# Patient Record
Sex: FEMALE | Race: WHITE | NOT HISPANIC OR LATINO | Employment: FULL TIME | URBAN - METROPOLITAN AREA
[De-identification: names, ages, dates, MRNs, and addresses within clinical notes are randomized per-mention and may not be internally consistent; named-entity substitution may affect disease eponyms.]

---

## 2024-03-08 ENCOUNTER — TELEPHONE (OUTPATIENT)
Dept: PULMONOLOGY | Facility: CLINIC | Age: 64
End: 2024-03-08
Payer: COMMERCIAL

## 2024-03-08 NOTE — TELEPHONE ENCOUNTER
Referral from [place] faxed to [facility] (received in email): pt needs to be triaged for ILD referral.           Mona King RN, BSN  ILD Nurse   [phone]    ----- Message from Mona King RN sent at 3/8/2024  3:11 PM CST -----  Regarding: VM: ILD Referral  Pt states Referral from [place] Pt  is [birth year] (not [birth year] as listed on referral)  Information from Dr. Viera as well as Teofilo should be in care everywhere per pt.   Pt call back # [phone]

## 2024-03-12 DIAGNOSIS — J84.9 ILD (INTERSTITIAL LUNG DISEASE) (H): Primary | ICD-10-CM

## 2024-03-18 ENCOUNTER — TELEPHONE (OUTPATIENT)
Dept: PULMONOLOGY | Facility: CLINIC | Age: 64
End: 2024-03-18
Payer: COMMERCIAL

## 2024-03-18 DIAGNOSIS — J84.9 ILD (INTERSTITIAL LUNG DISEASE) (H): Primary | ICD-10-CM

## 2024-03-18 NOTE — TELEPHONE ENCOUNTER
Orders placed for U/A, CBC with diff, CMP, CRP, repeat ANCA and HRCT w/o contrats for upcoming visit with Dr. Bailey.     Mona King, RN, BSN  ILD Nurse   [phone]    ----- Message from Kemal Beasley sent at 3/18/2024 10:25 AM CDT -----  Regarding: FW: Sooner appt??  Shanna, can you also place these lab orders that Yaya is requesting ??  Arnie !!!    Jorge    ----- Message -----  From: Jorge Bailey MD  Sent: 3/18/2024  10:00 AM CDT  To: Serafin Wu MD; Kemal Beasley; #  Subject: RE: Sooner appt??                                In addition, can we get the CT chest images performed in [place] pushed into our system (2/22/24, 1/20/24, 11/29/23, 3/9/23 and 7/21/22), and on the day she sees me in addition to Yancy's orders below we need U/A, CBC with diff, CMP, CRP and repeat ANCA.     Thank you   AK   ----- Message -----  From: Serafin Wu MD  Sent: 3/15/2024  10:59 PM CDT  To: Jorge Bailey MD; Kemal Beasley; #  Subject: RE: Sooner appt??                                Jorge,    I connected with [facility]. Patient has aggressive vasulitis and he has agreed to see the patient on one of his openings or 4/4. Please schedule patient for pedro, 6MWT and Ct chest without contrast if not done within the last 1 month (recent change in symptoms).   ----- Message -----  From: Kemal Beasley  Sent: 3/15/2024   4:11 PM CDT  To: Serafin Wu MD; Ailyn Salazar RN; #  Subject: RE: Sooner appt??                                Hi Yancy. There is nothing really avail before 5/28. The hemoptysis and the airway issue may be now a new thing as she appeared to be fine when I talked to her this week. Regardless, either you may have to overbook her or maybe squeeze her in in your LTX clinic if able (and we can have one of our nurses be with you in the transplant clinic when you see her ?) ...    Jorge    ----- Message -----  From: Ailyn Salazar RN  Sent: 3/15/2024   1:48 PM CDT  To: Kemal Beasley  Subject: Sooner appt??                                       ----- Message -----  From: Serafin Wu MD  Sent: 3/15/2024   1:46 PM CDT  To: [facility] Nurs[facility]    ILD team,    Dr Viera has called me few times about her. Worried about her course with hemoptysis and airway issues. She is scheduled with me for 5/28. Do I have any early openings? I can see her in the next 2-3 weeks.

## 2024-03-21 ENCOUNTER — LAB (OUTPATIENT)
Dept: LAB | Facility: CLINIC | Age: 64
End: 2024-03-21
Attending: INTERNAL MEDICINE
Payer: COMMERCIAL

## 2024-03-21 ENCOUNTER — MYC MEDICAL ADVICE (OUTPATIENT)
Dept: PULMONOLOGY | Facility: CLINIC | Age: 64
End: 2024-03-21

## 2024-03-21 ENCOUNTER — LAB REQUISITION (OUTPATIENT)
Dept: LAB | Facility: CLINIC | Age: 64
End: 2024-03-21
Payer: COMMERCIAL

## 2024-03-21 ENCOUNTER — OFFICE VISIT (OUTPATIENT)
Dept: PULMONOLOGY | Facility: CLINIC | Age: 64
End: 2024-03-21
Attending: INTERNAL MEDICINE
Payer: COMMERCIAL

## 2024-03-21 ENCOUNTER — ANCILLARY PROCEDURE (OUTPATIENT)
Dept: CT IMAGING | Facility: CLINIC | Age: 64
End: 2024-03-21
Attending: INTERNAL MEDICINE
Payer: COMMERCIAL

## 2024-03-21 VITALS
SYSTOLIC BLOOD PRESSURE: 130 MMHG | HEART RATE: 71 BPM | BODY MASS INDEX: 24.22 KG/M2 | OXYGEN SATURATION: 95 % | DIASTOLIC BLOOD PRESSURE: 83 MMHG | WEIGHT: 131.6 LBS | HEIGHT: 62 IN

## 2024-03-21 DIAGNOSIS — I77.82 ANCA-ASSOCIATED VASCULITIS (H): ICD-10-CM

## 2024-03-21 DIAGNOSIS — J84.9 ILD (INTERSTITIAL LUNG DISEASE) (H): ICD-10-CM

## 2024-03-21 DIAGNOSIS — Z79.2 PROPHYLACTIC ANTIBIOTIC: Primary | ICD-10-CM

## 2024-03-21 LAB
6 MIN WALK (FT): 1500 FT
6 MIN WALK (M): 457 M
ALBUMIN SERPL BCG-MCNC: 4.3 G/DL (ref 3.5–5.2)
ALBUMIN UR-MCNC: NEGATIVE MG/DL
ALP SERPL-CCNC: 60 U/L (ref 40–150)
ALT SERPL W P-5'-P-CCNC: 8 U/L (ref 0–50)
ANION GAP SERPL CALCULATED.3IONS-SCNC: 13 MMOL/L (ref 7–15)
APPEARANCE UR: CLEAR
AST SERPL W P-5'-P-CCNC: 11 U/L (ref 0–45)
BASOPHILS # BLD AUTO: 0 10E3/UL (ref 0–0.2)
BASOPHILS NFR BLD AUTO: 0 %
BILIRUB SERPL-MCNC: 0.5 MG/DL
BILIRUB UR QL STRIP: NEGATIVE
BUN SERPL-MCNC: 20 MG/DL (ref 8–23)
CALCIUM SERPL-MCNC: 9.3 MG/DL (ref 8.8–10.2)
CHLORIDE SERPL-SCNC: 102 MMOL/L (ref 98–107)
COLOR UR AUTO: ABNORMAL
CREAT SERPL-MCNC: 0.78 MG/DL (ref 0.51–0.95)
CRP SERPL-MCNC: <3 MG/L
DEPRECATED HCO3 PLAS-SCNC: 27 MMOL/L (ref 22–29)
EGFRCR SERPLBLD CKD-EPI 2021: 84 ML/MIN/1.73M2
EOSINOPHIL # BLD AUTO: 0.1 10E3/UL (ref 0–0.7)
EOSINOPHIL NFR BLD AUTO: 1 %
ERYTHROCYTE [DISTWIDTH] IN BLOOD BY AUTOMATED COUNT: 14.3 % (ref 10–15)
GLUCOSE SERPL-MCNC: 86 MG/DL (ref 70–99)
GLUCOSE UR STRIP-MCNC: NEGATIVE MG/DL
HCT VFR BLD AUTO: 40.9 % (ref 35–47)
HGB BLD-MCNC: 13.1 G/DL (ref 11.7–15.7)
HGB UR QL STRIP: NEGATIVE
IMM GRANULOCYTES # BLD: 0 10E3/UL
IMM GRANULOCYTES NFR BLD: 0 %
KETONES UR STRIP-MCNC: NEGATIVE MG/DL
LEUKOCYTE ESTERASE UR QL STRIP: NEGATIVE
LYMPHOCYTES # BLD AUTO: 3.3 10E3/UL (ref 0.8–5.3)
LYMPHOCYTES NFR BLD AUTO: 39 %
MCH RBC QN AUTO: 28.9 PG (ref 26.5–33)
MCHC RBC AUTO-ENTMCNC: 32 G/DL (ref 31.5–36.5)
MCV RBC AUTO: 90 FL (ref 78–100)
MONOCYTES # BLD AUTO: 0.6 10E3/UL (ref 0–1.3)
MONOCYTES NFR BLD AUTO: 7 %
MUCOUS THREADS #/AREA URNS LPF: PRESENT /LPF
NEUTROPHILS # BLD AUTO: 4.4 10E3/UL (ref 1.6–8.3)
NEUTROPHILS NFR BLD AUTO: 53 %
NITRATE UR QL: NEGATIVE
NRBC # BLD AUTO: 0 10E3/UL
NRBC BLD AUTO-RTO: 0 /100
PH UR STRIP: 5.5 [PH] (ref 5–7)
PLATELET # BLD AUTO: 270 10E3/UL (ref 150–450)
POTASSIUM SERPL-SCNC: 3.3 MMOL/L (ref 3.4–5.3)
PROT SERPL-MCNC: 7 G/DL (ref 6.4–8.3)
RBC # BLD AUTO: 4.54 10E6/UL (ref 3.8–5.2)
RBC URINE: 1 /HPF
SODIUM SERPL-SCNC: 142 MMOL/L (ref 135–145)
SP GR UR STRIP: 1.03 (ref 1–1.03)
SQUAMOUS EPITHELIAL: 9 /HPF
TRANSITIONAL EPI: <1 /HPF
UROBILINOGEN UR STRIP-MCNC: NORMAL MG/DL
WBC # BLD AUTO: 8.5 10E3/UL (ref 4–11)
WBC URINE: 2 /HPF

## 2024-03-21 PROCEDURE — 86037 ANCA TITER EACH ANTIBODY: CPT | Performed by: INTERNAL MEDICINE

## 2024-03-21 PROCEDURE — 86140 C-REACTIVE PROTEIN: CPT | Performed by: PATHOLOGY

## 2024-03-21 PROCEDURE — 94060 EVALUATION OF WHEEZING: CPT | Performed by: INTERNAL MEDICINE

## 2024-03-21 PROCEDURE — 99000 SPECIMEN HANDLING OFFICE-LAB: CPT | Performed by: PATHOLOGY

## 2024-03-21 PROCEDURE — 71250 CT THORAX DX C-: CPT | Mod: GC | Performed by: RADIOLOGY

## 2024-03-21 PROCEDURE — 88321 CONSLTJ&REPRT SLD PREP ELSWR: CPT | Performed by: STUDENT IN AN ORGANIZED HEALTH CARE EDUCATION/TRAINING PROGRAM

## 2024-03-21 PROCEDURE — 99213 OFFICE O/P EST LOW 20 MIN: CPT | Performed by: INTERNAL MEDICINE

## 2024-03-21 PROCEDURE — 85025 COMPLETE CBC W/AUTO DIFF WBC: CPT | Performed by: PATHOLOGY

## 2024-03-21 PROCEDURE — 94729 DIFFUSING CAPACITY: CPT | Performed by: INTERNAL MEDICINE

## 2024-03-21 PROCEDURE — 94726 PLETHYSMOGRAPHY LUNG VOLUMES: CPT | Performed by: INTERNAL MEDICINE

## 2024-03-21 PROCEDURE — 80053 COMPREHEN METABOLIC PANEL: CPT | Performed by: PATHOLOGY

## 2024-03-21 PROCEDURE — 94618 PULMONARY STRESS TESTING: CPT | Performed by: INTERNAL MEDICINE

## 2024-03-21 PROCEDURE — 99205 OFFICE O/P NEW HI 60 MIN: CPT | Mod: 25 | Performed by: INTERNAL MEDICINE

## 2024-03-21 PROCEDURE — 36415 COLL VENOUS BLD VENIPUNCTURE: CPT | Performed by: PATHOLOGY

## 2024-03-21 PROCEDURE — 81001 URINALYSIS AUTO W/SCOPE: CPT | Performed by: PATHOLOGY

## 2024-03-21 RX ORDER — ACETYLCYSTEINE 600 MG
600 CAPSULE ORAL 3 TIMES DAILY
COMMUNITY
End: 2024-03-21

## 2024-03-21 RX ORDER — BUDESONIDE, GLYCOPYRROLATE, AND FORMOTEROL FUMARATE 160; 9; 4.8 UG/1; UG/1; UG/1
AEROSOL, METERED RESPIRATORY (INHALATION)
COMMUNITY
Start: 2024-01-15 | End: 2024-03-21

## 2024-03-21 RX ORDER — SULFAMETHOXAZOLE AND TRIMETHOPRIM 400; 80 MG/1; MG/1
1 TABLET ORAL DAILY
COMMUNITY
Start: 2024-03-15 | End: 2024-05-08

## 2024-03-21 RX ORDER — PREDNISONE 20 MG/1
60 TABLET ORAL DAILY
Status: ON HOLD | COMMUNITY
Start: 2024-03-15 | End: 2024-07-20

## 2024-03-21 RX ORDER — ESTRADIOL 0.04 MG/D
1 PATCH, EXTENDED RELEASE TRANSDERMAL
COMMUNITY
Start: 2022-05-05

## 2024-03-21 RX ORDER — SULFAMETHOXAZOLE AND TRIMETHOPRIM 400; 80 MG/1; MG/1
1 TABLET ORAL DAILY
Qty: 30 TABLET | Refills: 0 | Status: SHIPPED | OUTPATIENT
Start: 2024-03-21 | End: 2024-04-20

## 2024-03-21 RX ORDER — VALACYCLOVIR HYDROCHLORIDE 500 MG/1
500 TABLET, FILM COATED ORAL 2 TIMES DAILY
COMMUNITY
Start: 2022-05-17

## 2024-03-21 RX ORDER — LEVOTHYROXINE SODIUM 112 UG/1
112 TABLET ORAL DAILY
COMMUNITY
Start: 2022-07-26

## 2024-03-21 ASSESSMENT — ENCOUNTER SYMPTOMS
DIARRHEA: 0
TREMORS: 1
BRUISES/BLEEDS EASILY: 0
FEVER: 0
WHEEZING: 1
EYE PAIN: 0
BLOOD IN STOOL: 0
TINGLING: 0
MYALGIAS: 0
ABDOMINAL PAIN: 0
WEIGHT LOSS: 1
BLURRED VISION: 0
COUGH: 1
EYE REDNESS: 0
HEMATURIA: 0
HEADACHES: 0
SPUTUM PRODUCTION: 1
HEARTBURN: 0
CHILLS: 0
SPEECH CHANGE: 0
HEMOPTYSIS: 1
STRIDOR: 0
SHORTNESS OF BREATH: 1

## 2024-03-21 ASSESSMENT — PAIN SCALES - GENERAL: PAINLEVEL: NO PAIN (0)

## 2024-03-21 NOTE — PROGRESS NOTES
[facility]    Reason for Visit  Rose Krishna is a 64 year old year old female who is being seen for Consult (New Vasculitis )    HPI  Patient is 64 years old female with no previous pulm history prior to the year 2021, remote diagnosis of ulcerative colitis 1985 post sulfa and prednisone at the time, never needing biologics and mainly self-limited over the last several years. She first developed symptoms of chronic cough in December 2021.  Her cough was dry at the time, but gradually became productive, coughing up up to half-1 cough of greenish frothy sputum every day.  In early 2022 she received Tessalon, followed albuterol inhaler [worsened cough], amoxicillin, then azithromycin (for pneumonia on CT), prednisone bursts starting with 20 mg max dose, and eventually Breo in July 2022 with no improvement in the cough at any point.  She has never been on an ACE inhibitor.  Chest imaging suggested pneumonia in May 2022 by CT for which azithromycin was given.  Her labs showed no eosinophilia but her MPO antibody was positive.  She then underwent cardiac, ENT and GI workup for other causes of cough and all returned within normal.  Methacholine challenge test was performed in July 2023 (FEV1 dropped by 22% and was interpreted as positive) which suggested a new diagnosis of asthma.  She was then prescribed multiple inhalers with no improvement including budesonide and Breztri, this is specifically perplexing as she also does not respond to prednisone 20 mg.  Nebulizers also do not help. She saw [place] (Dr. Velazquez) and at the time her bronchitis was suspected to be an association of her IBD, and this was prior to her lung consolidation finding. Myeloperoxidase antibody and P-ANCA were positive on repeat testing.  CT chest imaging in January 2024 showed a right lower lobe consolidation and she underwent bronchoscopy with BAL, her BAL did not show infection and there was no  eosinophils.  Right lower lobe demonstrated an ulcer which was biopsied, and we have the report from outside documenting granulomatous inflammation with negative AFB and fungal stains.  The airway exam was also significant for diffuse inflammation. She was started on prednisone at my direction 60 mg p.o. daily along with Bactrim on 3/16/24 when I received a phone call to discuss her case. She denies any change in her cough so far. She has never had hematuria, skin rash, skin ulcers, arthralgia, myalgia, eye symptoms, oral ulcers, tingling or numbness in any extremity, or facial nerve palsies.  She is a lifetime non-smoker and never had drug use.  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      Review of Systems   Constitutional:  Positive for malaise/fatigue and weight loss. Negative for chills and fever.   HENT:  Positive for nosebleeds. Negative for congestion and hearing loss.    Eyes:  Negative for blurred vision, pain and redness.   Respiratory:  Positive for cough, hemoptysis, sputum production, shortness of breath and wheezing. Negative for stridor.    Cardiovascular:  Positive for chest pain.   Gastrointestinal:  Negative for abdominal pain, blood in stool, diarrhea and heartburn.   Genitourinary:  Negative for hematuria.   Musculoskeletal:  Negative for joint pain and myalgias.   Skin:  Negative for itching and rash.   Neurological:  Positive for tremors. Negative for tingling, speech change and headaches.   Endo/Heme/Allergies:  Does not bruise/bleed easily.        Current Outpatient Medications   Medication    estradiol (VIVELLE-DOT) 0.0375 MG/24HR BIW patch    levothyroxine (SYNTHROID/LEVOTHROID) 100 MCG tablet    predniSONE (DELTASONE) 20 MG tablet    sulfamethoxazole-trimethoprim (BACTRIM) 400-80 MG tablet    valACYclovir (VALTREX) 500 MG tablet     No current facility-administered medications for this visit.     Allergies   Allergen Reactions    Sumatriptan  "Swelling, Other (See Comments) and Shortness Of Breath     Throat     History reviewed. No pertinent past medical history.    History reviewed. No pertinent surgical history.    Social History     Socioeconomic History    Marital status:      Spouse name: Not on file    Number of children: Not on file    Years of education: Not on file    Highest education level: Not on file   Occupational History    Not on file   Tobacco Use    Smoking status: Never    Smokeless tobacco: Never   Substance and Sexual Activity    Alcohol use: Not Currently    Drug use: Never    Sexual activity: Not on file   Other Topics Concern    Not on file   Social History Narrative    Not on file     Social Determinants of Health     Financial Resource Strain: Not on file   Food Insecurity: Not on file   Transportation Needs: Not on file   Physical Activity: Not on file   Stress: Not on file   Social Connections: Not on file   Interpersonal Safety: Not on file   Housing Stability: Not on file       History reviewed. No pertinent family history.      Vitals: /83   Pulse 71   Ht 1.575 m (5' 2\")   Wt 59.7 kg (131 lb 9.6 oz)   SpO2 95%   BMI 24.07 kg/m      Exam:   GENERAL APPEARANCE: Well developed, well nourished, alert, and in no apparent distress.  LYMPHATICS: No significant cervical, or supraclavicular nodes.  HEENT: Normal oral mucus membranes, tongue and dentition. Normal nose.   RESP: good air flow throughout.  No crackles. No rhonchi. No wheezes.  CV: Normal S1, S2, regular rhythm, normal rate. No murmur.  No LE edema.   MS: extremities normal. No clubbing. No cyanosis.  SKIN: no rash on limited exam.  NEURO: Mentation intact, speech normal, normal gait and stance.  PSYCH: mentation appears normal. and affect normal/bright.    Results:  PFTs 3/21/24      Chest imaging:  Reviewed HRCT chest images performed 3/21/24 and compared to outside CT images from 2/22/24, 1/20/24, 3/2023, and 7/2021  Waxing and waning RLL " consolidative lesion, that is now significantly smaller than prior, with RUL anterior smaller consolidation, no cavity lesions. Progressive diffuse bronchitis in 1993-1516 that is now seemingly improved.   Radiology report 3/21/24:     FINDINGS:  Mediastinum: Prominent thyroid without discrete nodularity, similar to  prior. The heart is normal in size. No significant  pericardial  effusion. No significant  coronary artery calcifications. The  ascending aorta and main pulmonary artery are normal in caliber. No   thoracic lymphadenopathy. The esophagus is unremarkable.      Lungs: The airway is clear. No pleural effusion or pneumothorax. Lower  lobe predominant bronchial wall thickening with multifocal mucous  plugging of the distal airways. Chronic linear atelectasis or scarring  at the lung bases, similar to prior. Cluster of micronodular opacities  in the posteromedial left lower lobe, new from prior. When comparing  to the 1/20/2024 study. The areas of peribronchial consolidation and  subsegmental consolidation are improving. No significant reticular  opacities or bronchiectasis. Mild mosaic attenuation predominantly  involving the lower lobes on expiratory views. Groundglass nodular  opacity in the anterior right upper lobe measures 1. (Series 3 image  92), not significant changed from 3/8/2023.     Upper Abdomen: Limited evaluation of the upper abdomen. Status post  cholecystectomy.     Bones/soft tissues: No acute or suspicious osseous lesion. Bilateral  breast implants.                                                                      IMPRESSION:   1. Findings suggestive of chronic bronchitis with lower lobe  predominant bronchial wall thickening and mild air trapping on  expiratory views.   2. Cluster of micronodular opacities in the left lower lobe,  suggesting acute infection/patient.  3. Linear atelectasis/scarring in the lung bases, similar to prior.  Previous areas of consolidation seen on 1/20/2024,  continued to  improve.  4. Stable 1.2 cm nodular groundglass opacity in the anterior right  upper lobe. This is favored sequelae of infection/inflammation however  continued attention on follow-up is recommended to ensure stability.   This result has not been signed. Information might be incomplete.     Bronchoscopy report 3/13/24  Findings:       The trachea is of normal caliber. The sundeep is sharp. The        tracheobronchial tree of the left lung was examined to at least the        first subsegmental level. Bronchial mucosa and anatomy in the left lung        were inflamed in appearance. There are no endobronchial lesions, and no        secretions.        Right Lung Abnormalities: An area of inflamed mucosa was found in the        right lower lobe. there were areas of ulceration and friability. There        were yellow plaque-like lesions in the RLL. The RLL superior segment was        inflamed and stenosed shut (unable to pass a forceps).        Endobronchial biopsies of a lesion were performed right lower lobe using        a forceps and sent for histopathology examination. 8 were obtained.        The bronchoscope was advanced until wedged at the desired location for        bronchoalveolar lavage. BAL was performed in the RLL lateral basal        segment (B9) of the lung and sent for cell count, bacterial culture,        viral smears & culture, and fungal & AFB analysis and cytology. 120 mL        of fluid were instilled. 40 mL were returned. The return was        blood-tinged. There were no mucoid plugs in the return fluid.   Impression:            - Hemoptysis with abnormal CXR                          - Chronic cough with abnormal CT                          - The airway examination of the left lung was abnormal                          - Mucosal inflammation, ulceration, yellow plaque was                          visualized in the right lower lobe. RLL superior                          segment was  inflamed/stenosed closed.     BAL 3/13/24  Negative blasto and histo antigens  Neutrophils 71%, Lymph 1%, Mono 28%    RLL Endobronchial biopsy 3/13/24=  Granulomatous inflammation, negative AFB and fungal stains     Assessment and plan:    ANCA-associated vasculitis, single organ, endobronchial inflammation, RLL superior segment stenosis and severe chronic bronchitis, BVAS=5   Rose is 64F with chronic productive cough starting in late 2021, hemoptysis most of 2023, repeatedly positive p-ANCA/MPO and recent finding on bronchoscopy showing ulceration and inflammation of RLL bronchi and stenosis of RLL superior segment, with endobronchial biopsy showing granulomatous inflammation. Stains for AFB and fungi were negative, and infectious BAL workup has been negative as well including histo and blasto testing. She also has a negative TB QFT. She has no extra-pulmonary involvement as she has already seen GI, ENT and had cardiac workup. Renal function and U/A within normal. She does have remote history of IBD that seems to be self-limited and has never received biological therapy.   - We started prednisone 60 mg po daily 3/16/24 after phone conversation with local pulmonologist Dr. Inna Viera, will taper down over 3 months  - Continue bactrim for PJP prophylaxis, and continue even below dose of 20 mg to help with airway inflammation   - [facility], she already had negative TB QFT, HIV and hepatitis viral panel   - Rituximab 375/m2 weekly x4   - CD20 with return labs in May     2. Chronic cough   Very troubling for her, failed ICS, tessalon, nebulizers, and po prednisone 20 mg. Gabapentin can be considered after inflammation control and reduction in sputum production. We discussed gabapentin prescription and side effects     3. Hemoptysis- improving   4. 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

## 2024-03-21 NOTE — PATIENT INSTRUCTIONS
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

## 2024-03-21 NOTE — LETTER
3/21/2024         RE: Rose Krishna  [address] [place] [address]        Dear Colleague,    Thank you for referring your patient, Rose Krishna, to the [facility] FOR LUNG SCIENCE AND [facility] [place]. Please see a copy of my visit note below.    [facility]    Reason for Visit  Rose Krishna is a 64 year old year old female who is being seen for Consult (New Vasculitis )    HPI  Patient is 64 years old female with no previous pulm history prior to the year 2021, remote diagnosis of ulcerative colitis 1985 post sulfa and prednisone at the time, never needing biologics and mainly self-limited over the last several years. She first developed symptoms of chronic cough in December 2021.  Her cough was dry at the time, but gradually became productive, coughing up up to half-1 cough of greenish frothy sputum every day.  In early 2022 she received Tessalon, followed albuterol inhaler [worsened cough], amoxicillin, then azithromycin (for pneumonia on CT), prednisone bursts starting with 20 mg max dose, and eventually Breo in July 2022 with no improvement in the cough at any point.  She has never been on an ACE inhibitor.  Chest imaging suggested pneumonia in May 2022 by CT for which azithromycin was given.  Her labs showed no eosinophilia but her MPO antibody was positive.  She then underwent cardiac, ENT and GI workup for other causes of cough and all returned within normal.  Methacholine challenge test was performed in July 2023 (FEV1 dropped by 22% and was interpreted as positive) which suggested a new diagnosis of asthma.  She was then prescribed multiple inhalers with no improvement including budesonide and Breztri, this is specifically perplexing as she also does not respond to prednisone 20 mg.  Nebulizers also do not help. She saw [place] (Dr. Velazquez) and at the time her bronchitis was suspected to be an association of her IBD, and  this was prior to her lung consolidation finding. Myeloperoxidase antibody and P-ANCA were positive on repeat testing.  CT chest imaging in January 2024 showed a right lower lobe consolidation and she underwent bronchoscopy with BAL, her BAL did not show infection and there was no eosinophils.  Right lower lobe demonstrated an ulcer which was biopsied, and we have the report from outside documenting granulomatous inflammation with negative AFB and fungal stains.  The airway exam was also significant for diffuse inflammation. She was started on prednisone at my direction 60 mg p.o. daily along with Bactrim on 3/16/24 when I received a phone call to discuss her case. She denies any change in her cough so far. She has never had hematuria, skin rash, skin ulcers, arthralgia, myalgia, eye symptoms, oral ulcers, tingling or numbness in any extremity, or facial nerve palsies.  She is a lifetime non-smoker and never had drug use.  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      Review of Systems   Constitutional:  Positive for malaise/fatigue and weight loss. Negative for chills and fever.   HENT:  Positive for nosebleeds. Negative for congestion and hearing loss.    Eyes:  Negative for blurred vision, pain and redness.   Respiratory:  Positive for cough, hemoptysis, sputum production, shortness of breath and wheezing. Negative for stridor.    Cardiovascular:  Positive for chest pain.   Gastrointestinal:  Negative for abdominal pain, blood in stool, diarrhea and heartburn.   Genitourinary:  Negative for hematuria.   Musculoskeletal:  Negative for joint pain and myalgias.   Skin:  Negative for itching and rash.   Neurological:  Positive for tremors. Negative for tingling, speech change and headaches.   Endo/Heme/Allergies:  Does not bruise/bleed easily.        Current Outpatient Medications   Medication     estradiol (VIVELLE-DOT) 0.0375 MG/24HR BIW patch     levothyroxine  "(SYNTHROID/LEVOTHROID) 100 MCG tablet     predniSONE (DELTASONE) 20 MG tablet     sulfamethoxazole-trimethoprim (BACTRIM) 400-80 MG tablet     valACYclovir (VALTREX) 500 MG tablet     No current facility-administered medications for this visit.     Allergies   Allergen Reactions     Sumatriptan Swelling, Other (See Comments) and Shortness Of Breath     Throat     History reviewed. No pertinent past medical history.    History reviewed. No pertinent surgical history.    Social History     Socioeconomic History     Marital status:      Spouse name: Not on file     Number of children: Not on file     Years of education: Not on file     Highest education level: Not on file   Occupational History     Not on file   Tobacco Use     Smoking status: Never     Smokeless tobacco: Never   Substance and Sexual Activity     Alcohol use: Not Currently     Drug use: Never     Sexual activity: Not on file   Other Topics Concern     Not on file   Social History Narrative     Not on file     Social Determinants of Health     Financial Resource Strain: Not on file   Food Insecurity: Not on file   Transportation Needs: Not on file   Physical Activity: Not on file   Stress: Not on file   Social Connections: Not on file   Interpersonal Safety: Not on file   Housing Stability: Not on file       History reviewed. No pertinent family history.      Vitals: /83   Pulse 71   Ht 1.575 m (5' 2\")   Wt 59.7 kg (131 lb 9.6 oz)   SpO2 95%   BMI 24.07 kg/m      Exam:   GENERAL APPEARANCE: Well developed, well nourished, alert, and in no apparent distress.  LYMPHATICS: No significant cervical, or supraclavicular nodes.  HEENT: Normal oral mucus membranes, tongue and dentition. Normal nose.   RESP: good air flow throughout.  No crackles. No rhonchi. No wheezes.  CV: Normal S1, S2, regular rhythm, normal rate. No murmur.  No LE edema.   MS: extremities normal. No clubbing. No cyanosis.  SKIN: no rash on limited exam.  NEURO: Mentation " intact, speech normal, normal gait and stance.  PSYCH: mentation appears normal. and affect normal/bright.    Results:  PFTs 3/21/24      Chest imaging:  Reviewed HRCT chest images performed 3/21/24 and compared to outside CT images from 2/22/24, 1/20/24, 3/2023, and 7/2021  Waxing and waning RLL consolidative lesion, that is now significantly smaller than prior, with RUL anterior smaller consolidation, no cavity lesions. Progressive diffuse bronchitis in 8899-0809 that is now seemingly improved.   Radiology report 3/21/24:     FINDINGS:  Mediastinum: Prominent thyroid without discrete nodularity, similar to  prior. The heart is normal in size. No significant  pericardial  effusion. No significant  coronary artery calcifications. The  ascending aorta and main pulmonary artery are normal in caliber. No   thoracic lymphadenopathy. The esophagus is unremarkable.      Lungs: The airway is clear. No pleural effusion or pneumothorax. Lower  lobe predominant bronchial wall thickening with multifocal mucous  plugging of the distal airways. Chronic linear atelectasis or scarring  at the lung bases, similar to prior. Cluster of micronodular opacities  in the posteromedial left lower lobe, new from prior. When comparing  to the 1/20/2024 study. The areas of peribronchial consolidation and  subsegmental consolidation are improving. No significant reticular  opacities or bronchiectasis. Mild mosaic attenuation predominantly  involving the lower lobes on expiratory views. Groundglass nodular  opacity in the anterior right upper lobe measures 1. (Series 3 image  92), not significant changed from 3/8/2023.     Upper Abdomen: Limited evaluation of the upper abdomen. Status post  cholecystectomy.     Bones/soft tissues: No acute or suspicious osseous lesion. Bilateral  breast implants.                                                                      IMPRESSION:   1. Findings suggestive of chronic bronchitis with lower  lobe  predominant bronchial wall thickening and mild air trapping on  expiratory views.   2. Cluster of micronodular opacities in the left lower lobe,  suggesting acute infection/patient.  3. Linear atelectasis/scarring in the lung bases, similar to prior.  Previous areas of consolidation seen on 1/20/2024, continued to  improve.  4. Stable 1.2 cm nodular groundglass opacity in the anterior right  upper lobe. This is favored sequelae of infection/inflammation however  continued attention on follow-up is recommended to ensure stability.   This result has not been signed. Information might be incomplete.     Bronchoscopy report 3/13/24  Findings:       The trachea is of normal caliber. The sundeep is sharp. The        tracheobronchial tree of the left lung was examined to at least the        first subsegmental level. Bronchial mucosa and anatomy in the left lung        were inflamed in appearance. There are no endobronchial lesions, and no        secretions.        Right Lung Abnormalities: An area of inflamed mucosa was found in the        right lower lobe. there were areas of ulceration and friability. There        were yellow plaque-like lesions in the RLL. The RLL superior segment was        inflamed and stenosed shut (unable to pass a forceps).        Endobronchial biopsies of a lesion were performed right lower lobe using        a forceps and sent for histopathology examination. 8 were obtained.        The bronchoscope was advanced until wedged at the desired location for        bronchoalveolar lavage. BAL was performed in the RLL lateral basal        segment (B9) of the lung and sent for cell count, bacterial culture,        viral smears & culture, and fungal & AFB analysis and cytology. 120 mL        of fluid were instilled. 40 mL were returned. The return was        blood-tinged. There were no mucoid plugs in the return fluid.   Impression:            - Hemoptysis with abnormal CXR                          -  Chronic cough with abnormal CT                          - The airway examination of the left lung was abnormal                          - Mucosal inflammation, ulceration, yellow plaque was                          visualized in the right lower lobe. RLL superior                          segment was inflamed/stenosed closed.     BAL 3/13/24  Negative blasto and histo antigens  Neutrophils 71%, Lymph 1%, Mono 28%    RLL Endobronchial biopsy 3/13/24=  Granulomatous inflammation, negative AFB and fungal stains     Assessment and plan:    ANCA-associated vasculitis, single organ, endobronchial inflammation, RLL superior segment stenosis and severe chronic bronchitis, BVAS=5   Rose is 64F with chronic productive cough starting in late 2021, hemoptysis most of 2023, repeatedly positive p-ANCA/MPO and recent finding on bronchoscopy showing ulceration and inflammation of RLL bronchi and stenosis of RLL superior segment, with endobronchial biopsy showing granulomatous inflammation. Stains for AFB and fungi were negative, and infectious BAL workup has been negative as well including histo and blasto testing. She also has a negative TB QFT. She has no extra-pulmonary involvement as she has already seen GI, ENT and had cardiac workup. Renal function and U/A within normal. She does have remote history of IBD that seems to be self-limited and has never received biological therapy.   - We started prednisone 60 mg po daily 3/16/24 after phone conversation with local pulmonologist Dr. Inna Viera, will taper down over 3 months  - Continue bactrim for PJP prophylaxis, and continue even below dose of 20 mg to help with airway inflammation   - Educational materials on Rituximab provided on [employer], she already had negative TB QFT, HIV and hepatitis viral panel   - Rituximab 375/m2 weekly x4   - CD20 with return labs in May     2. Chronic cough   Very troubling for her, failed ICS, tessalon, nebulizers, and po prednisone 20 mg.  Gabapentin can be considered after inflammation control and reduction in sputum production. We discussed gabapentin prescription and side effects     3. Hemoptysis- improving   4. Chronic bronchitis secondary to ANCA vasculitis     Return in May with PFTs, labs and HRCT chest     I spent 70 minutes reviewing chart, reviewing test results, talking with and examining patient, formulating plan, and documentation on the day of the encounter.            Met with patient after new consult visit with provider to introduce role as nurse care coordinator and provide direct phone number for future questions or concerns related to their care received in [facility]. Also provided contact information for [facility] Clinic Coordinator for use related to scheduling needs.     Oxygen: Currently using oxygen, 0 LPM at rest, 0 LPM with activity and 2 LPM nocturnally.     Patient scheduled for sleep study early April    Tasia Coulter RN      Again, thank you for allowing me to participate in the care of your patient.        Sincerely,        Jorge Bailey MD

## 2024-03-21 NOTE — PROGRESS NOTES
Met with patient after new consult visit with provider to introduce role as nurse care coordinator and provide direct phone number for future questions or concerns related to their care received in [facility]. Also provided contact information for [facility] Clinic Coordinator for use related to scheduling needs.     Oxygen: Currently using oxygen, 0 LPM at rest, 0 LPM with activity and 2 LPM nocturnally.     Patient scheduled for sleep study early April    Tasia Coulter RN

## 2024-03-22 DIAGNOSIS — I77.82 ANCA-ASSOCIATED VASCULITIS (H): Primary | ICD-10-CM

## 2024-03-22 RX ORDER — DIPHENHYDRAMINE HYDROCHLORIDE 50 MG/ML
50 INJECTION INTRAMUSCULAR; INTRAVENOUS
Start: 2024-03-25

## 2024-03-22 RX ORDER — HEPARIN SODIUM,PORCINE 10 UNIT/ML
5-20 VIAL (ML) INTRAVENOUS DAILY PRN
OUTPATIENT
Start: 2024-03-25

## 2024-03-22 RX ORDER — ACETAMINOPHEN 325 MG/1
650 TABLET ORAL ONCE
Start: 2024-03-25

## 2024-03-22 RX ORDER — HEPARIN SODIUM (PORCINE) LOCK FLUSH IV SOLN 100 UNIT/ML 100 UNIT/ML
5 SOLUTION INTRAVENOUS
OUTPATIENT
Start: 2024-03-25

## 2024-03-22 RX ORDER — METHYLPREDNISOLONE SODIUM SUCCINATE 125 MG/2ML
125 INJECTION, POWDER, LYOPHILIZED, FOR SOLUTION INTRAMUSCULAR; INTRAVENOUS
Start: 2024-03-25

## 2024-03-22 RX ORDER — MEPERIDINE HYDROCHLORIDE 25 MG/ML
25 INJECTION INTRAMUSCULAR; INTRAVENOUS; SUBCUTANEOUS EVERY 30 MIN PRN
OUTPATIENT
Start: 2024-03-25

## 2024-03-22 RX ORDER — METHYLPREDNISOLONE SODIUM SUCCINATE 125 MG/2ML
100 INJECTION, POWDER, LYOPHILIZED, FOR SOLUTION INTRAMUSCULAR; INTRAVENOUS ONCE
OUTPATIENT
Start: 2024-03-25

## 2024-03-22 RX ORDER — EPINEPHRINE 1 MG/ML
0.3 INJECTION, SOLUTION, CONCENTRATE INTRAVENOUS EVERY 5 MIN PRN
OUTPATIENT
Start: 2024-03-25

## 2024-03-22 RX ORDER — ALBUTEROL SULFATE 90 UG/1
1-2 AEROSOL, METERED RESPIRATORY (INHALATION)
Start: 2024-03-25

## 2024-03-22 RX ORDER — ALBUTEROL SULFATE 0.83 MG/ML
2.5 SOLUTION RESPIRATORY (INHALATION)
OUTPATIENT
Start: 2024-03-25

## 2024-03-22 RX ORDER — DIPHENHYDRAMINE HCL 25 MG
50 CAPSULE ORAL ONCE
Start: 2024-03-25

## 2024-03-22 NOTE — TELEPHONE ENCOUNTER
Nurse placed call to [place] infusion center, they require local provider order rituximab infusion  Call placed to Inna Viera's (referring providers) clinic at [phone]  Nurse Carmina at [place] pulmonology clinic reviewed that Inna can order rituximab but asked that Dr. Bailey order and fax orders to her  Writer confirmed order per Dr. Bailey  Therapy plan ordered and faxed to nurse Carmina at [phone]  Orders placed for monitoring labs, to be drawn 3 mos after final infusion   Call placed to patient to review where we are in the process of the infusion, and preferred lab for lab work.   Patient agreed to reach out middle of next week if she doesn't hear anything about the rituximab infusion. Patient requests labs be drawn at [facility], orders faxed to [phone]  Patient expressed gratitude and is agreeable to plan    Tasia Coulter RN

## 2024-03-24 LAB
ANCA AB PATTERN SER IF-IMP: NORMAL
C-ANCA TITR SER IF: NORMAL {TITER}

## 2024-03-25 LAB
PATH REPORT.COMMENTS IMP SPEC: NORMAL
PATH REPORT.FINAL DX SPEC: NORMAL
PATH REPORT.GROSS SPEC: NORMAL
PATH REPORT.MICROSCOPIC SPEC OTHER STN: NORMAL
PATH REPORT.RELEVANT HX SPEC: NORMAL
PATH REPORT.RELEVANT HX SPEC: NORMAL
PATH REPORT.SITE OF ORIGIN SPEC: NORMAL

## 2024-03-26 LAB
DLCOUNC-%PRED-PRE: 98 %
DLCOUNC-PRE: 18.14 ML/MIN/MMHG
DLCOUNC-PRED: 18.45 ML/MIN/MMHG
ERV-%PRED-PRE: 88 %
ERV-PRE: 0.86 L
ERV-PRED: 0.97 L
EXPTIME-PRE: 6.89 SEC
FEF2575-%PRED-POST: 39 %
FEF2575-%PRED-PRE: 43 %
FEF2575-POST: 0.75 L/SEC
FEF2575-PRE: 0.82 L/SEC
FEF2575-PRED: 1.9 L/SEC
FEFMAX-%PRED-PRE: 49 %
FEFMAX-PRE: 2.87 L/SEC
FEFMAX-PRED: 5.75 L/SEC
FEV1-%PRED-PRE: 65 %
FEV1-PRE: 1.36 L
FEV1FEV6-PRE: 65 %
FEV1FEV6-PRED: 80 %
FEV1FVC-PRE: 65 %
FEV1FVC-PRED: 80 %
FEV1SVC-PRE: 69 %
FEV1SVC-PRED: 70 %
FIFMAX-PRE: 2.21 L/SEC
FRCPLETH-%PRED-PRE: 103 %
FRCPLETH-PRE: 2.69 L
FRCPLETH-PRED: 2.59 L
FVC-%PRED-PRE: 80 %
FVC-PRE: 2.09 L
FVC-PRED: 2.6 L
IC-%PRED-PRE: 57 %
IC-PRE: 1.11 L
IC-PRED: 1.94 L
RVPLETH-%PRED-PRE: 97 %
RVPLETH-PRE: 1.83 L
RVPLETH-PRED: 1.88 L
TLCPLETH-%PRED-PRE: 82 %
TLCPLETH-PRE: 3.8 L
TLCPLETH-PRED: 4.6 L
VA-%PRED-PRE: 68 %
VA-PRE: 2.95 L
VC-%PRED-PRE: 66 %
VC-PRE: 1.97 L
VC-PRED: 2.97 L

## 2024-04-01 ENCOUNTER — TEAM CONFERENCE (OUTPATIENT)
Dept: PULMONOLOGY | Facility: CLINIC | Age: 64
End: 2024-04-01
Payer: COMMERCIAL

## 2024-04-01 NOTE — TELEPHONE ENCOUNTER
ILD Conference      Patient Name: Rose Krishna    Reason for conference discussion/Specific Question:  Treatment     Pathology Interpretation:   RLL transbronchial histocytes, microphages present, granulomas different then sarcoid,   Microscopic polyangiitis  IBD associated disease, or systemic vasculitis Patel Gómez MD (path)      There was a consensus recommendation for the following actions:     RTX infusions  Pred taper     Pulmonary/ILD Provider: Jorge Bailey MD (pulm)

## 2024-04-09 ENCOUNTER — TELEPHONE (OUTPATIENT)
Dept: PULMONOLOGY | Facility: CLINIC | Age: 64
End: 2024-04-09
Payer: COMMERCIAL

## 2024-04-09 NOTE — TELEPHONE ENCOUNTER
----- Message from Jaleesa Mcmahon sent at 4/8/2024 12:49 PM CDT -----  Regarding: RE: Rituxan  Could the Infusion Appointment Request be taken out of the therapy plan, so the orders would not continue to fall into the infusion work que?     Thank you,  Jaleesa FAJARDO [facility] - 2nd Floor  [facility]  [phone] (option 3 then option 2)  [facility] Scheduling   [phone] (option 5 then option 2)      ----- Message -----  From: Jorge Bailey MD  Sent: 4/8/2024  10:28 AM CDT  To: Tasia Coulter, DENIS; Jaleesa Mcmahon  Subject: RE: Rituxan                                      Yes I discussed with the patient that she will be receiving four infusions over four weeks of RTX locally, we will then decide in future visits how frequently she will need it again.     AK   ----- Message -----  From: Jaleesa Mcmahon  Sent: 4/7/2024  11:23 AM CDT  To: Jorge Bailey MD; Tasia Coulter, RN  Subject: Rituxan                                          Morning    Wondering if patient is aware of the Rituxan that is ordered and where she might like to do it?  Looks like she lives in [place]...     Or could the Infusion Appointment Request be taken out of the therapy plan, so the orders would not continue to fall into the infusion work que?     Thank you,  Jaleesa FAJARDO [facility] - 2nd Floor  [facility] Scheduling  [phone] (option 3 then option 2)  [facility] Scheduling   [phone] (option 5 then option 2)

## 2024-04-09 NOTE — TELEPHONE ENCOUNTER
A user error has taken place: encounter opened in error, closed for administrative reasons.  Tasia Coulter RN

## 2024-04-09 NOTE — PROGRESS NOTES
"Request received from infusion center to remove the \"appointment request\" from therapy plan as patient will be going to outside facility     Encounter opened to discontinue appointment request    Tasia Coulter RN    "

## 2024-05-08 DIAGNOSIS — J84.9 ILD (INTERSTITIAL LUNG DISEASE) (H): Primary | ICD-10-CM

## 2024-05-08 RX ORDER — SULFAMETHOXAZOLE AND TRIMETHOPRIM 400; 80 MG/1; MG/1
TABLET ORAL
Qty: 30 TABLET | Refills: 3 | Status: SHIPPED | OUTPATIENT
Start: 2024-05-08 | End: 2024-06-06

## 2024-05-15 ENCOUNTER — TRANSFERRED RECORDS (OUTPATIENT)
Dept: HEALTH INFORMATION MANAGEMENT | Facility: CLINIC | Age: 64
End: 2024-05-15
Payer: COMMERCIAL

## 2024-05-24 ENCOUNTER — TRANSFERRED RECORDS (OUTPATIENT)
Dept: HEALTH INFORMATION MANAGEMENT | Facility: CLINIC | Age: 64
End: 2024-05-24
Payer: COMMERCIAL

## 2024-05-29 ENCOUNTER — PATIENT OUTREACH (OUTPATIENT)
Dept: ONCOLOGY | Facility: CLINIC | Age: 64
End: 2024-05-29
Payer: COMMERCIAL

## 2024-05-29 DIAGNOSIS — M31.7 MPA (MICROSCOPIC POLYANGIITIS) (H): Primary | ICD-10-CM

## 2024-05-29 NOTE — PROGRESS NOTES
New IP (Interventional Pulmonology) referral rec'd.  Chart reviewed.       New Patient: Interventional Pulmonary (Lung nodule) Nurse Navigator Note    [facility]    Referred to (specialty): Interventional Pulmonary (Lung nodule)    Requested provider (if applicable): n/a    Date Referral Received: 5/29/2024    Evaluation for :  MPO ANCA patient with BI and RUL involvement including ulcerations and segmental stenosis, only partially responsive to systemic therapy needs IP to assess for local therapy    Clinical History (per Nurse review of records provided):    **BOOK MARKED**    3/24/2024:  PFT & 6 minute walk      6/6/2024:  Scheduled for CT High-resolution      EXAMINATION: Chest CT  3/21/2024 9:37 AM     CLINICAL HISTORY: very elevated ANCA, SOB and hemoptysis; ILD  (interstitial lung disease) (H)     COMPARISON: CT 2/22/2024.     TECHNIQUE: CT imaging obtained through the chest without contrast.  Axial, coronal, and sagittal reconstructions and axial MIP reformatted  images are reviewed. Inspiratory and expiratory views obtained.     FINDINGS:  Mediastinum: Prominent thyroid without discrete nodularity, similar to  prior. The heart is normal in size. No significant  pericardial  effusion. No significant  coronary artery calcifications. The  ascending aorta and main pulmonary artery are normal in caliber. No   thoracic lymphadenopathy. The esophagus is unremarkable.      Lungs: The airway is clear. No pleural effusion or pneumothorax. Lower  lobe predominant bronchial wall thickening with multifocal mucous  plugging of the distal airways. Chronic linear atelectasis or scarring  at the lung bases, similar to prior. Cluster of micronodular opacities  in the posteromedial left lower lobe, new from prior. When comparing  to the 1/20/2024 study. The areas of peribronchial consolidation and  subsegmental consolidation  are improving. No significant reticular  opacities or bronchiectasis. Mild mosaic attenuation predominantly  involving the lower lobes on expiratory views. Groundglass nodular  opacity in the anterior right upper lobe measures 1. (Series 3 image  92), not significant changed from 3/8/2023.     Upper Abdomen: Limited evaluation of the upper abdomen. Status post  cholecystectomy.     Bones/soft tissues: No acute or suspicious osseous lesion. Bilateral  breast implants.                                                                      IMPRESSION:   1. Findings suggestive of chronic bronchitis with lower lobe  predominant bronchial wall thickening and mild air trapping on  expiratory views.   2. Cluster of micronodular opacities in the left lower lobe,  suggesting acute infection/patient.  3. Linear atelectasis/scarring in the lung bases, similar to prior.  Previous areas of consolidation seen on 1/20/2024, continued to  improve.  4. Stable 1.2 cm nodular groundglass opacity in the anterior right  upper lobe. This is favored sequelae of infection/inflammation however  continued attention on follow-up is recommended to ensure stability.     I have personally reviewed the examination and initial interpretation  and I agree with the findings.     DIANDRA BOYLE MD    Records Location: [facility] & [facility]  & [place])    RECORDS NEEDED: Last FIVE years CHEST imaging pushed to PACS from [facility] [place] --thank you!     Additional testing needed prior to consult: NONE

## 2024-05-29 NOTE — PROGRESS NOTES
Received call from local rheumatologist Dr Ross, patient is having more respiratory symptoms despite completion of RTX x4 infusions. Rheum is adding avacopan, prednisone 60 taper and augmentin course to cover for infection which I am in agreement with. Will request an urgent IP appointment for potential need for bronchoscopy therapies for BI/RUL ulceration and stenosis reported by local pulmonologist which is likely worsening. We are also requesting recent CTA to be pushed into our system.

## 2024-05-30 ENCOUNTER — PRE VISIT (OUTPATIENT)
Dept: ONCOLOGY | Facility: CLINIC | Age: 64
End: 2024-05-30
Payer: COMMERCIAL

## 2024-05-30 NOTE — TELEPHONE ENCOUNTER
RECORDS STATUS - ALL OTHER DIAGNOSIS      RECORDS RECEIVED FROM:    Appt Date: TBD NN WQ    Last FIVE years CHEST imaging pushed to PACS from [facility] --thank you!!     Action    Action Taken 5/30/2024 12:15pm FRANKO     Med Records Dept @ [facility]     Ph: [phone]  Fax: [phone]  Mailing Address:   [address] [place] [address]    [employer] Tracking Number: [id number]    Red's scan is already in PACS      NOTES STATUS DETAILS   OFFICE NOTE from referring provider  Jorge Bailey MD    OFFICE NOTE from medical oncologist     DISCHARGE SUMMARY from hospital     DISCHARGE REPORT from the ER     OPERATIVE REPORT     MEDICATION LIST Complete [facility]   CLINICAL TRIAL TREATMENTS TO DATE     LABS     PATHOLOGY REPORTS     ANYTHING RELATED TO DIAGNOSIS Complete Labs last updated on 5/30/2024 in [facility]   PATHOLOGY [employer] TRACKING   TRACKING #:   GENONOMIC TESTING     TYPE:     IMAGING (NEED IMAGES & REPORT)     CT SCANS Scheduled    Requested- [facility] ([facility])  [facility]- Requested- CTA Chest 5/24/2024      In PACS:   CT Chest 3/21/2024   CTA Chest 2/22/2024  CTA Chest 1/20/2024  CT abdomen Pelvis 11/28/2023   CT Chest 3/8/2023   CTA Chest 7/21/2022    [place]- in PACS- 11/28/2023    XRAYS Requested- [facility])     Requested- [place]  Xray Chest 5/17/2024   Xray Chest 4/16/2024     Requested- Xray Chest [facility] in [place] 10/26/2020    IMAGE DISC [employer] TRACKING   TRACKING #:

## 2024-06-04 NOTE — PROGRESS NOTES
[facility]    Reason for Visit  Rose Krishna is a 64 year old year old female who is being seen for Interstitial Lung Disease (ILD) (ILD Follow up )  HPI  Patient is 64 years old female with no previous pulm history prior to the year 2021, remote diagnosis of ulcerative colitis 1985 post sulfa and prednisone at the time, never needing biologics and mainly self-limited over the last several years. She first developed symptoms of chronic cough in December 2021.  Her cough was dry at the time, but gradually became productive, coughing up up to half-1 cough of greenish frothy sputum every day.  In early 2022 she received Tessalon, followed albuterol inhaler [worsened cough], amoxicillin, then azithromycin (for pneumonia on CT), prednisone bursts starting with 20 mg max dose, and eventually Breo in July 2022 with no improvement in the cough at any point.  She has never been on an ACE inhibitor.  Chest imaging suggested pneumonia in May 2022 by CT for which azithromycin was given.  Her labs showed no eosinophilia but her MPO antibody was positive.  She then underwent cardiac, ENT and GI workup for other causes of cough and all returned within normal.  Methacholine challenge test was performed in July 2023 (FEV1 dropped by 22% and was interpreted as positive) which suggested a new diagnosis of asthma.  She was then prescribed multiple inhalers with no improvement including budesonide and Breztri, this is specifically perplexing as she also does not respond to prednisone 20 mg.  Nebulizers also do not help. She saw [place] (Dr. Velazquez) and at the time her bronchitis was suspected to be an association of her IBD, and this was prior to her lung consolidation finding. Myeloperoxidase antibody and P-ANCA were positive on repeat testing.  CT chest imaging in January 2024 showed a right lower lobe consolidation and she underwent bronchoscopy with BAL, her BAL did not show infection and  "there was no eosinophils.  Right lower lobe demonstrated an ulcer which was biopsied, and we have the report from outside documenting granulomatous inflammation with negative AFB and fungal stains.  The airway exam was also significant for diffuse inflammation. She was started on prednisone at my direction 60 mg p.o. daily along with Bactrim on 3/16/24 when I received a phone call to discuss her case. She is a lifetime non-smoker and never had drug use.  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    Interval Hx 6/24:  Started on Rituximab on 4/12 along with 3 days of solumedrol infusion for increasing hemoptysis. Had tried to titrate down on prednisone multiple times unsuccessfully. Had some stridor per PCP, worsening cough/congestion on 5/24 so CT neck ordered. Saw rheumatology on 5/29 and returned to 60 mg Prednisone, started on Tavneous and a 10 day course of Augmentin.  Planned for repeat bronch prior to starting cytoxin.     Overall, patient feels like she \"got run over by a truck\". Did not note a difference in her symptoms with increase of prednisone or starting the Tavneos this week so far. She has been having a \"wetter\", more productive cough, constant, worse in am. Still having peach/pink sputum. Much more shortness of breath with shorter distances, stairs are difficult. Feels wheezy, audible stridor worse when lying flat in bed. Sputum has been green but Augmentin helped this. No fevers, chills, night sweats. Not on any inhalers or nebs since they do not help her. No leg swelling, chest pain, orthopnea. BP today 137/91, worsening BP which is new for her and she is concerned about this.     Current Outpatient Medications   Medication Sig Dispense Refill    dexAMETHasone (DECADRON) 2 MG tablet Take 5 tablets (10 mg) by mouth daily (with breakfast) for 5 days, THEN 4 tablets (8 mg) daily (with breakfast) for 5 days, THEN 3 tablets (6 mg) daily (with breakfast) for 5 " days, THEN 2 tablets (4 mg) daily (with breakfast) for 5 days, THEN 1 tablet (2 mg) daily (with breakfast) for 5 days, THEN 0.5 tablets (1 mg) daily (with breakfast) for 5 days. 78 tablet 0    estradiol (VIVELLE-DOT) 0.0375 MG/24HR BIW patch Place 1 patch onto the skin      fludrocortisone (FLORINEF) 0.1 MG tablet Take 1 tablet (0.1 mg) by mouth daily for 5 days, THEN 1 tablet (0.1 mg) every 48 hours for 10 days. 10 tablet 0    levothyroxine (SYNTHROID/LEVOTHROID) 100 MCG tablet Take 100 mcg by mouth      predniSONE (DELTASONE) 20 MG tablet Take 60 mg by mouth daily      sulfamethoxazole-trimethoprim (BACTRIM DS) 800-160 MG tablet Take 1 tablet by mouth 2 times daily for 7 days 14 tablet 0    sulfamethoxazole-trimethoprim (BACTRIM DS) 800-160 MG tablet Take 1 tablet by mouth 2 times daily for 7 days 14 tablet 0    TAVNEOS 10 MG CAPS Take 30 mg by mouth      valACYclovir (VALTREX) 500 MG tablet Take 500 mg by mouth as needed       No current facility-administered medications for this visit.     Allergies   Allergen Reactions    Sumatriptan Swelling, Other (See Comments) and Shortness Of Breath     Throat     No past medical history on file.    No past surgical history on file.    Social History     Socioeconomic History    Marital status:      Spouse name: Not on file    Number of children: Not on file    Years of education: Not on file    Highest education level: Not on file   Occupational History    Not on file   Tobacco Use    Smoking status: Never    Smokeless tobacco: Never   Substance and Sexual Activity    Alcohol use: Not Currently    Drug use: Never    Sexual activity: Not on file   Other Topics Concern    Not on file   Social History Narrative    Not on file     Social Determinants of Health     Financial Resource Strain: Low Risk  (3/26/2024)    Received from  and Affiliates    Overall Financial Resource Strain (CARDIA)     Difficulty of Paying Living Expenses: Not very hard   Food  Insecurity: No Food Insecurity (3/26/2024)    Received from [facility]    Hunger Vital Sign     Worried About Running Out of Food in the Last Year: Never true     Ran Out of Food in the Last Year: Never true   Transportation Needs: No Transportation Needs (3/26/2024)    Received from [facility]    PRAPARE - Transportation     Lack of Transportation (Medical): No     Lack of Transportation (Non-Medical): No   Physical Activity: Insufficiently Active (3/26/2024)    Received from [facility]    Exercise Vital Sign     Days of Exercise per Week: 4 days     Minutes of Exercise per Session: 20 min   Stress: No Stress Concern Present (3/26/2024)    Received from [facility]    [employer] [place] [employer] - Occupational Stress Questionnaire     Feeling of Stress : Only a little   Social Connections: Socially Integrated (3/26/2024)    Received from [facility]    Social Connection and Isolation Panel [NHANES]     Frequency of Communication with Friends and Family: More than three times a week     Frequency of Social Gatherings with Friends and Family: More than three times a week     Attends Christian Services: More than 4 times per year     Active Member of Clubs or Organizations: Yes     Attends Club or Organization Meetings: 1 to 4 times per year     Marital Status:    Interpersonal Safety: Not At Risk (2/7/2023)    [facility]    Humiliation, Afraid, Rape, and Kick questionnaire     Fear of Current or Ex-Partner: No     Emotionally Abused: No     Physically Abused: No     Sexually Abused: No   Housing Stability: Low Risk  (3/26/2024)    Received from [facility]    Housing Stability Vital Sign     Unable to Pay for Housing in the Last Year: No     Number of Places Lived in the Last Year: 1     Unstable Housing in the Last Year: No  "      No family history on file.    Vitals: BP (!) 137/91   Pulse 86   Ht 1.575 m (5' 2\")   Wt 59.9 kg (132 lb)   SpO2 97%   BMI 24.14 kg/m      Exam:   GENERAL APPEARANCE: Well developed, well nourished, alert, and in no apparent distress.  LYMPHATICS: No significant cervical, or supraclavicular nodes.  HEENT: Normal oral mucus membranes, tongue and dentition. Normal nose. Neck appears swollen with upper airway wheezing but protecting airway.  RESP: good air flow throughout.  Diffuse bilateral rhonchi that improve with cough.  CV: Normal S1, S2, regular rhythm, normal rate. No murmur.  No LE edema.   MS: extremities normal. No clubbing. No cyanosis.  SKIN: no rash on limited exam.  NEURO: Mentation intact, speech normal, normal gait and stance.  PSYCH: mentation appears normal. and affect normal/bright.  Results:  PFTs 6/6/24      Chest imaging:    CT Chest 6/6/24:  IMPRESSION: Patchy groundglass opacities, bronchial wall thickening  and cavitary nodule right upper lobe compatible with ANCA vasculitis,  with groundglass opacities likely representing mild alveolar  hemorrhage likely accounting for hemoptysis.    Assessment and plan:    ANCA-associated vasculitis, single organ, endobronchial inflammation, RLL superior segment stenosis and severe chronic bronchitis, BVAS=5   Rose is 64F with chronic productive cough starting in late 2021, hemoptysis most of 2023, repeatedly positive p-ANCA/MPO and recent finding on bronchoscopy showing ulceration and inflammation of RLL bronchi and stenosis of RLL superior segment, with endobronchial biopsy showing granulomatous inflammation most c/w ANCA vasculitis. No other organ involvement at that time, treated with Rituximab x4 and steroids. Since last visit, patient had worsening of her respiratory status and now upper airway symptoms, not responsive to oral antibiotics and increased prednisone.  6/3/24 CT revealing laryngeal wall thickening, PFTS show worsening " obstruction but CT chest relatively stable. Most concerning for supraglottic involvement of her vasculitis contributing to decompensation, would first proceed with ENT consultation prior to IP evaluation.  -- ENT consult for evaluation of supraglottic laryngeal wall thickening as soon as possible  - Patient will keep her consult with interventional pulmonology here at the [place] to evaluate for repeat bronch with BAL +/- endobronchial stenting of stenosed airway RLL  -Given upper airway involvement, will transition prednisone to Decadron to address any component of upper airway edema.  Will start at 10 mg daily with taper   > Concomitantly will prescribe fludrocortisone taper given prolonged prednisone dose  -We will increase her Bactrim to therapeutic levels for airway inflammation as well for 10 days.  After that she will continue on her PJP prophylaxis dose    2. Chronic cough   Has not responded to ICS, tessalon, nebulizers. Gabapentin considered after inflammation control and reduction in sputum production. Not discussed further today.    3. Hemoptysis-stable  4. 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 Bailey.     Breonna Kimble  Internal Medicine PGY2    Attestation:   I saw and examined the patient with Resident.  Case discussed - agree with note.  Patient is 64F known to have MPO vasculitis (RUL ulceration and segmental stenosis, central airway disease-asthma like), s/p RTX infusions x4 April-May 2024, now on prednisone 60 mg for several weeks for refractory respiratory symptoms. Outside CT neck shows supraglottic laryngeal mass vs inflammation. CD20 yesterday 6/6/24 was zero however so RTX should be fully active at this point and her ANCA serology converted to negative. We will transition her to dexamethasone taper to minimize laryngeal edema per above, and supporting cytoxan per local rheumatology for further  immunosuppression to achieve symptom control. Will follow up on IP visit soon for potential intervention on bronchus intermedius stenosis seen on CT that might be contributing to her symptoms and the reason for her being symptomatic despite immunosuppression. Also an urgent local ENT referral has been placed to examine the supraglottic region.     Jorge Bailey M.D.    Total time spent today on patient encounter, documentation, review of chart and care coordination was 70 minutes.

## 2024-06-04 NOTE — TELEPHONE ENCOUNTER
Imaging DISC Received  June 4, 2024 12:34 PM ABT   Action: Imaging disc from [facility] received and uploaded to PACS. Image reports received and sent to HIM for upload.    05/24/24: CTA Chest  05/15/24: XR Chest  04/16/24: XR Chest

## 2024-06-06 ENCOUNTER — OFFICE VISIT (OUTPATIENT)
Dept: PULMONOLOGY | Facility: CLINIC | Age: 64
End: 2024-06-06
Attending: INTERNAL MEDICINE
Payer: COMMERCIAL

## 2024-06-06 ENCOUNTER — ANCILLARY PROCEDURE (OUTPATIENT)
Dept: CT IMAGING | Facility: CLINIC | Age: 64
End: 2024-06-06
Attending: INTERNAL MEDICINE
Payer: COMMERCIAL

## 2024-06-06 ENCOUNTER — LAB (OUTPATIENT)
Dept: LAB | Facility: CLINIC | Age: 64
End: 2024-06-06
Attending: INTERNAL MEDICINE
Payer: COMMERCIAL

## 2024-06-06 ENCOUNTER — MYC MEDICAL ADVICE (OUTPATIENT)
Dept: PULMONOLOGY | Facility: CLINIC | Age: 64
End: 2024-06-06

## 2024-06-06 VITALS
BODY MASS INDEX: 24.29 KG/M2 | SYSTOLIC BLOOD PRESSURE: 137 MMHG | DIASTOLIC BLOOD PRESSURE: 91 MMHG | HEART RATE: 86 BPM | OXYGEN SATURATION: 97 % | HEIGHT: 62 IN | WEIGHT: 132 LBS

## 2024-06-06 DIAGNOSIS — J04.30 SUPRAGLOTTITIS WITHOUT AIRWAY OBSTRUCTION: ICD-10-CM

## 2024-06-06 DIAGNOSIS — J84.9 ILD (INTERSTITIAL LUNG DISEASE) (H): Primary | ICD-10-CM

## 2024-06-06 DIAGNOSIS — I77.82 ANCA-ASSOCIATED VASCULITIS (H): Primary | ICD-10-CM

## 2024-06-06 DIAGNOSIS — I77.82 ANCA-ASSOCIATED VASCULITIS (H): ICD-10-CM

## 2024-06-06 DIAGNOSIS — J38.6 SUPRAGLOTTIC STENOSIS: ICD-10-CM

## 2024-06-06 LAB
ACANTHOCYTES BLD QL SMEAR: SLIGHT
ALBUMIN SERPL BCG-MCNC: 4.3 G/DL (ref 3.5–5.2)
ALP SERPL-CCNC: 52 U/L (ref 40–150)
ALT SERPL W P-5'-P-CCNC: 15 U/L (ref 0–50)
ANION GAP SERPL CALCULATED.3IONS-SCNC: 13 MMOL/L (ref 7–15)
AST SERPL W P-5'-P-CCNC: 14 U/L (ref 0–45)
BASOPHILS # BLD AUTO: ABNORMAL 10*3/UL
BASOPHILS # BLD MANUAL: 0 10E3/UL (ref 0–0.2)
BASOPHILS NFR BLD AUTO: ABNORMAL %
BASOPHILS NFR BLD MANUAL: 0 %
BILIRUB SERPL-MCNC: 0.3 MG/DL
BUN SERPL-MCNC: 19 MG/DL (ref 8–23)
CALCIUM SERPL-MCNC: 9.1 MG/DL (ref 8.8–10.2)
CHLORIDE SERPL-SCNC: 100 MMOL/L (ref 98–107)
CREAT SERPL-MCNC: 0.74 MG/DL (ref 0.51–0.95)
CRP SERPL-MCNC: <3 MG/L
DACRYOCYTES BLD QL SMEAR: SLIGHT
DEPRECATED HCO3 PLAS-SCNC: 26 MMOL/L (ref 22–29)
EGFRCR SERPLBLD CKD-EPI 2021: 90 ML/MIN/1.73M2
EOSINOPHIL # BLD AUTO: ABNORMAL 10*3/UL
EOSINOPHIL # BLD MANUAL: 0 10E3/UL (ref 0–0.7)
EOSINOPHIL NFR BLD AUTO: ABNORMAL %
EOSINOPHIL NFR BLD MANUAL: 0 %
ERYTHROCYTE [DISTWIDTH] IN BLOOD BY AUTOMATED COUNT: 16.5 % (ref 10–15)
GLUCOSE SERPL-MCNC: 110 MG/DL (ref 70–99)
HCT VFR BLD AUTO: 43.3 % (ref 35–47)
HGB BLD-MCNC: 14 G/DL (ref 11.7–15.7)
IMM GRANULOCYTES # BLD: ABNORMAL 10*3/UL
IMM GRANULOCYTES NFR BLD: ABNORMAL %
LYMPHOCYTES # BLD AUTO: ABNORMAL 10*3/UL
LYMPHOCYTES # BLD MANUAL: 1.1 10E3/UL (ref 0.8–5.3)
LYMPHOCYTES NFR BLD AUTO: ABNORMAL %
LYMPHOCYTES NFR BLD MANUAL: 8 %
MCH RBC QN AUTO: 29.3 PG (ref 26.5–33)
MCHC RBC AUTO-ENTMCNC: 32.3 G/DL (ref 31.5–36.5)
MCV RBC AUTO: 91 FL (ref 78–100)
METAMYELOCYTES # BLD MANUAL: 0.3 10E3/UL
METAMYELOCYTES NFR BLD MANUAL: 2 %
MONOCYTES # BLD AUTO: ABNORMAL 10*3/UL
MONOCYTES # BLD MANUAL: 0.4 10E3/UL (ref 0–1.3)
MONOCYTES NFR BLD AUTO: ABNORMAL %
MONOCYTES NFR BLD MANUAL: 3 %
MYELOCYTES # BLD MANUAL: 0.4 10E3/UL
MYELOCYTES NFR BLD MANUAL: 3 %
NEUTROPHILS # BLD AUTO: ABNORMAL 10*3/UL
NEUTROPHILS # BLD MANUAL: 11.3 10E3/UL (ref 1.6–8.3)
NEUTROPHILS NFR BLD AUTO: ABNORMAL %
NEUTROPHILS NFR BLD MANUAL: 84 %
NRBC # BLD AUTO: 0 10E3/UL
NRBC BLD AUTO-RTO: 0 /100
PLAT MORPH BLD: ABNORMAL
PLATELET # BLD AUTO: 338 10E3/UL (ref 150–450)
POTASSIUM SERPL-SCNC: 4 MMOL/L (ref 3.4–5.3)
PROT SERPL-MCNC: 6.6 G/DL (ref 6.4–8.3)
RBC # BLD AUTO: 4.78 10E6/UL (ref 3.8–5.2)
RBC MORPH BLD: ABNORMAL
SODIUM SERPL-SCNC: 139 MMOL/L (ref 135–145)
WBC # BLD AUTO: 13.4 10E3/UL (ref 4–11)

## 2024-06-06 PROCEDURE — 94726 PLETHYSMOGRAPHY LUNG VOLUMES: CPT | Performed by: INTERNAL MEDICINE

## 2024-06-06 PROCEDURE — 86355 B CELLS TOTAL COUNT: CPT | Mod: 90 | Performed by: PATHOLOGY

## 2024-06-06 PROCEDURE — 86036 ANCA SCREEN EACH ANTIBODY: CPT | Performed by: INTERNAL MEDICINE

## 2024-06-06 PROCEDURE — 99000 SPECIMEN HANDLING OFFICE-LAB: CPT | Performed by: PATHOLOGY

## 2024-06-06 PROCEDURE — 99215 OFFICE O/P EST HI 40 MIN: CPT | Mod: 25 | Performed by: INTERNAL MEDICINE

## 2024-06-06 PROCEDURE — 83876 ASSAY MYELOPEROXIDASE: CPT | Performed by: INTERNAL MEDICINE

## 2024-06-06 PROCEDURE — 94375 RESPIRATORY FLOW VOLUME LOOP: CPT | Performed by: INTERNAL MEDICINE

## 2024-06-06 PROCEDURE — 99213 OFFICE O/P EST LOW 20 MIN: CPT | Performed by: INTERNAL MEDICINE

## 2024-06-06 PROCEDURE — 36415 COLL VENOUS BLD VENIPUNCTURE: CPT | Performed by: PATHOLOGY

## 2024-06-06 PROCEDURE — 80053 COMPREHEN METABOLIC PANEL: CPT | Performed by: PATHOLOGY

## 2024-06-06 PROCEDURE — 94729 DIFFUSING CAPACITY: CPT | Performed by: INTERNAL MEDICINE

## 2024-06-06 PROCEDURE — 85007 BL SMEAR W/DIFF WBC COUNT: CPT | Performed by: PATHOLOGY

## 2024-06-06 PROCEDURE — 71250 CT THORAX DX C-: CPT | Performed by: RADIOLOGY

## 2024-06-06 PROCEDURE — 85027 COMPLETE CBC AUTOMATED: CPT | Performed by: PATHOLOGY

## 2024-06-06 PROCEDURE — 86140 C-REACTIVE PROTEIN: CPT | Performed by: PATHOLOGY

## 2024-06-06 PROCEDURE — 86356 MONONUCLEAR CELL ANTIGEN: CPT | Mod: 90 | Performed by: PATHOLOGY

## 2024-06-06 RX ORDER — FLUDROCORTISONE ACETATE 0.1 MG/1
TABLET ORAL
Qty: 10 TABLET | Refills: 0 | Status: SHIPPED | OUTPATIENT
Start: 2024-06-06 | End: 2024-06-18

## 2024-06-06 RX ORDER — SULFAMETHOXAZOLE/TRIMETHOPRIM 800-160 MG
1 TABLET ORAL 2 TIMES DAILY
Qty: 14 TABLET | Refills: 0 | Status: SHIPPED | OUTPATIENT
Start: 2024-06-06 | End: 2024-06-11

## 2024-06-06 RX ORDER — AVACOPAN 10 MG/1
30 CAPSULE ORAL 2 TIMES DAILY
Status: ON HOLD | COMMUNITY
Start: 2024-05-29 | End: 2024-07-22

## 2024-06-06 RX ORDER — SULFAMETHOXAZOLE/TRIMETHOPRIM 800-160 MG
1 TABLET ORAL 2 TIMES DAILY
Qty: 14 TABLET | Refills: 0 | Status: SHIPPED | OUTPATIENT
Start: 2024-06-06 | End: 2024-06-17

## 2024-06-06 RX ORDER — DEXAMETHASONE 2 MG/1
TABLET ORAL
Qty: 78 TABLET | Refills: 0 | Status: SHIPPED | OUTPATIENT
Start: 2024-06-06 | End: 2024-06-26

## 2024-06-06 ASSESSMENT — PAIN SCALES - GENERAL: PAINLEVEL: NO PAIN (0)

## 2024-06-06 NOTE — PATIENT INSTRUCTIONS
Please call our direct ILD nurses line for questions and concerns: [phone]    For scheduling, please call: [phone]

## 2024-06-06 NOTE — PROGRESS NOTES
Rose Krishna comes into clinic today at the request of Dr Bailey , for PFT    Tolerated testing well. No adverse reactions. Left lab in no distress.        PAUL MARAVILLA

## 2024-06-06 NOTE — NURSING NOTE
Chief Complaint   Patient presents with    Interstitial Lung Disease (ILD)     ILD Follow up      Vitals were taken and medications were reconciled.    Rosalia Casarez RMA  2:15 PM

## 2024-06-06 NOTE — LETTER
6/6/2024      Rose Krishna  [address] [place] [address]      Dear Colleague,    Thank you for referring your patient, Rose Krishna, to the [facility] FOR LUNG SCIENCE AND [facility] [place]. Please see a copy of my visit note below.    [facility] Interstitial Lung Disease Clinic    Reason for Visit  Rose Krishna is a 64 year old year old female who is being seen for Interstitial Lung Disease (ILD) (ILD Follow up )  HPI  Patient is 64 years old female with no previous pulm history prior to the year 2021, remote diagnosis of ulcerative colitis 1985 post sulfa and prednisone at the time, never needing biologics and mainly self-limited over the last several years. She first developed symptoms of chronic cough in December 2021.  Her cough was dry at the time, but gradually became productive, coughing up up to half-1 cough of greenish frothy sputum every day.  In early 2022 she received Tessalon, followed albuterol inhaler [worsened cough], amoxicillin, then azithromycin (for pneumonia on CT), prednisone bursts starting with 20 mg max dose, and eventually Breo in July 2022 with no improvement in the cough at any point.  She has never been on an ACE inhibitor.  Chest imaging suggested pneumonia in May 2022 by CT for which azithromycin was given.  Her labs showed no eosinophilia but her MPO antibody was positive.  She then underwent cardiac, ENT and GI workup for other causes of cough and all returned within normal.  Methacholine challenge test was performed in July 2023 (FEV1 dropped by 22% and was interpreted as positive) which suggested a new diagnosis of asthma.  She was then prescribed multiple inhalers with no improvement including budesonide and Breztri, this is specifically perplexing as she also does not respond to prednisone 20 mg.  Nebulizers also do not help. She saw Red (Dr. Velazquez) and at the time her bronchitis was suspected to be an association of her  "IBD, and this was prior to her lung consolidation finding. Myeloperoxidase antibody and P-ANCA were positive on repeat testing.  CT chest imaging in January 2024 showed a right lower lobe consolidation and she underwent bronchoscopy with BAL, her BAL did not show infection and there was no eosinophils.  Right lower lobe demonstrated an ulcer which was biopsied, and we have the report from outside documenting granulomatous inflammation with negative AFB and fungal stains.  The airway exam was also significant for diffuse inflammation. She was started on prednisone at my direction 60 mg p.o. daily along with Bactrim on 3/16/24 when I received a phone call to discuss her case. She is a lifetime non-smoker and never had drug use.  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    Interval Hx 6/24:  Started on Rituximab on 4/12 along with 3 days of solumedrol infusion for increasing hemoptysis. Had tried to titrate down on prednisone multiple times unsuccessfully. Had some stridor per PCP, worsening cough/congestion on 5/24 so CT neck ordered. Saw rheumatology on 5/29 and returned to 60 mg Prednisone, started on Tavneous and a 10 day course of Augmentin.  Planned for repeat bronch prior to starting cytoxin.     Overall, patient feels like she \"got run over by a truck\". Did not note a difference in her symptoms with increase of prednisone or starting the Tavneos this week so far. She has been having a \"wetter\", more productive cough, constant, worse in am. Still having peach/pink sputum. Much more shortness of breath with shorter distances, stairs are difficult. Feels wheezy, audible stridor worse when lying flat in bed. Sputum has been green but Augmentin helped this. No fevers, chills, night sweats. Not on any inhalers or nebs since they do not help her. No leg swelling, chest pain, orthopnea. BP today 137/91, worsening BP which is new for her and she is concerned about this. "     Current Outpatient Medications   Medication Sig Dispense Refill     dexAMETHasone (DECADRON) 2 MG tablet Take 5 tablets (10 mg) by mouth daily (with breakfast) for 5 days, THEN 4 tablets (8 mg) daily (with breakfast) for 5 days, THEN 3 tablets (6 mg) daily (with breakfast) for 5 days, THEN 2 tablets (4 mg) daily (with breakfast) for 5 days, THEN 1 tablet (2 mg) daily (with breakfast) for 5 days, THEN 0.5 tablets (1 mg) daily (with breakfast) for 5 days. 78 tablet 0     estradiol (VIVELLE-DOT) 0.0375 MG/24HR BIW patch Place 1 patch onto the skin       fludrocortisone (FLORINEF) 0.1 MG tablet Take 1 tablet (0.1 mg) by mouth daily for 5 days, THEN 1 tablet (0.1 mg) every 48 hours for 10 days. 10 tablet 0     levothyroxine (SYNTHROID/LEVOTHROID) 100 MCG tablet Take 100 mcg by mouth       predniSONE (DELTASONE) 20 MG tablet Take 60 mg by mouth daily       sulfamethoxazole-trimethoprim (BACTRIM DS) 800-160 MG tablet Take 1 tablet by mouth 2 times daily for 7 days 14 tablet 0     sulfamethoxazole-trimethoprim (BACTRIM DS) 800-160 MG tablet Take 1 tablet by mouth 2 times daily for 7 days 14 tablet 0     TAVNEOS 10 MG CAPS Take 30 mg by mouth       valACYclovir (VALTREX) 500 MG tablet Take 500 mg by mouth as needed       No current facility-administered medications for this visit.     Allergies   Allergen Reactions     Sumatriptan Swelling, Other (See Comments) and Shortness Of Breath     Throat     No past medical history on file.    No past surgical history on file.    Social History     Socioeconomic History     Marital status:      Spouse name: Not on file     Number of children: Not on file     Years of education: Not on file     Highest education level: Not on file   Occupational History     Not on file   Tobacco Use     Smoking status: Never     Smokeless tobacco: Never   Substance and Sexual Activity     Alcohol use: Not Currently     Drug use: Never     Sexual activity: Not on file   Other Topics Concern      Not on file   Social History Narrative     Not on file     Social Determinants of Health     Financial Resource Strain: Low Risk  (3/26/2024)    Received from [facility]    Overall Financial Resource Strain (CARDIA)      Difficulty of Paying Living Expenses: Not very hard   Food Insecurity: No Food Insecurity (3/26/2024)    Received from [facility]    Hunger Vital Sign      Worried About Running Out of Food in the Last Year: Never true      Ran Out of Food in the Last Year: Never true   Transportation Needs: No Transportation Needs (3/26/2024)    Received from [facility]    PRAPARE - Transportation      Lack of Transportation (Medical): No      Lack of Transportation (Non-Medical): No   Physical Activity: Insufficiently Active (3/26/2024)    Received from [facility]    Exercise Vital Sign      Days of Exercise per Week: 4 days      Minutes of Exercise per Session: 20 min   Stress: No Stress Concern Present (3/26/2024)    Received from [facility]    [employer] [place] [employer] - Occupational Stress Questionnaire      Feeling of Stress : Only a little   Social Connections: Socially Integrated (3/26/2024)    Received from [facility]    Social Connection and Isolation Panel [NHANES]      Frequency of Communication with Friends and Family: More than three times a week      Frequency of Social Gatherings with Friends and Family: More than three times a week      Attends Jew Services: More than 4 times per year      Active Member of Clubs or Organizations: Yes      Attends Club or Organization Meetings: 1 to 4 times per year      Marital Status:    Interpersonal Safety: Not At Risk (2/7/2023)    Received from [facility], [facility]    Humiliation, Afraid, Rape, and Kick questionnaire      Fear of Current or Ex-Partner: No      Emotionally  "Abused: No      Physically Abused: No      Sexually Abused: No   Housing Stability: Low Risk  (3/26/2024)    [facility] from [facility]    Housing Stability Vital Sign      Unable to Pay for Housing in the Last Year: No      Number of Places Lived in the Last Year: 1      Unstable Housing in the Last Year: No       No family history on file.    Vitals: BP (!) 137/91   Pulse 86   Ht 1.575 m (5' 2\")   Wt 59.9 kg (132 lb)   SpO2 97%   BMI 24.14 kg/m      Exam:   GENERAL APPEARANCE: Well developed, well nourished, alert, and in no apparent distress.  LYMPHATICS: No significant cervical, or supraclavicular nodes.  HEENT: Normal oral mucus membranes, tongue and dentition. Normal nose. Neck appears swollen with upper airway wheezing but protecting airway.  RESP: good air flow throughout.  Diffuse bilateral rhonchi that improve with cough.  CV: Normal S1, S2, regular rhythm, normal rate. No murmur.  No LE edema.   MS: extremities normal. No clubbing. No cyanosis.  SKIN: no rash on limited exam.  NEURO: Mentation intact, speech normal, normal gait and stance.  PSYCH: mentation appears normal. and affect normal/bright.  Results:  PFTs 6/6/24      Chest imaging:    CT Chest 6/6/24:  IMPRESSION: Patchy groundglass opacities, bronchial wall thickening  and cavitary nodule right upper lobe compatible with ANCA vasculitis,  with groundglass opacities likely representing mild alveolar  hemorrhage likely accounting for hemoptysis.    Assessment and plan:    ANCA-associated vasculitis, single organ, endobronchial inflammation, RLL superior segment stenosis and severe chronic bronchitis, BVAS=5   Rose is 64F with chronic productive cough starting in late 2021, hemoptysis most of 2023, repeatedly positive p-ANCA/MPO and recent finding on bronchoscopy showing ulceration and inflammation of RLL bronchi and stenosis of RLL superior segment, with endobronchial biopsy showing granulomatous inflammation most c/w ANCA " vasculitis. No other organ involvement at that time, treated with Rituximab x4 and steroids. Since last visit, patient had worsening of her respiratory status and now upper airway symptoms, not responsive to oral antibiotics and increased prednisone.  6/3/24 CT revealing laryngeal wall thickening, PFTS show worsening obstruction but CT chest relatively stable. Most concerning for supraglottic involvement of her vasculitis contributing to decompensation, would first proceed with ENT consultation prior to IP evaluation.  -- ENT consult for evaluation of supraglottic laryngeal wall thickening as soon as possible  - Patient will keep her consult with interventional pulmonology here at the [place] to evaluate for repeat bronch with BAL +/- endobronchial stenting of stenosed airway RLL  -Given upper airway involvement, will transition prednisone to Decadron to address any component of upper airway edema.  Will start at 10 mg daily with taper   > Concomitantly will prescribe fludrocortisone taper given prolonged prednisone dose  -We will increase her Bactrim to therapeutic levels for airway inflammation as well for 10 days.  After that she will continue on her PJP prophylaxis dose    2. Chronic cough   Has not responded to ICS, tessalon, nebulizers. Gabapentin considered after inflammation control and reduction in sputum production. Not discussed further today.    3. Hemoptysis-stable  4. 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 Bailey.     Breonna Kimble  Internal Medicine PGY2    Attestation:   I saw and examined the patient with Resident.  Case discussed - agree with note.  Patient is 64F known to have MPO vasculitis (RUL ulceration and segmental stenosis, central airway disease-asthma like), s/p RTX infusions x4 April-May 2024, now on prednisone 60 mg for several weeks for refractory respiratory symptoms. Outside CT neck shows  supraglottic laryngeal mass vs inflammation. CD20 yesterday 6/6/24 was zero however so RTX should be fully active at this point and her ANCA serology converted to negative. We will transition her to dexamethasone taper to minimize laryngeal edema per above, and supporting cytoxan per local rheumatology for further immunosuppression to achieve symptom control. Will follow up on IP visit soon for potential intervention on bronchus intermedius stenosis seen on CT that might be contributing to her symptoms and the reason for her being symptomatic despite immunosuppression. Also an urgent local ENT referral has been placed to examine the supraglottic region.     Jorge Bailey M.D.    Total time spent today on patient encounter, documentation, review of chart and care coordination was 70 minutes.       Again, thank you for allowing me to participate in the care of your patient.        Sincerely,        Jorge Bailey MD

## 2024-06-07 LAB
ANCA AB PATTERN SER IF-IMP: NORMAL
C-ANCA TITR SER IF: NORMAL {TITER}
CD19 CELLS # BLD: 0 CELLS/MCL (ref 56.6–417.4)
CD19 CELLS NFR BLD: 0 % (ref 4.6–22.1)
CD20 CELLS # BLD: 0 CELLS/MCL (ref 74.4–441.1)
CD20 CELLS NFR BLD: 0 % (ref 5–22.3)
CD45 CELLS # BLD: 1.14 THOU/MCL (ref 1–3.33)
DLCOCOR-%PRED-PRE: 99 %
DLCOCOR-PRE: 18.41 ML/MIN/MMHG
DLCOUNC-%PRED-PRE: 101 %
DLCOUNC-PRE: 18.74 ML/MIN/MMHG
DLCOUNC-PRED: 18.43 ML/MIN/MMHG
ERV-%PRED-PRE: 24 %
ERV-PRE: 0.24 L
ERV-PRED: 0.97 L
EXPTIME-PRE: 7.09 SEC
FEF2575-%PRED-PRE: 21 %
FEF2575-PRE: 0.41 L/SEC
FEF2575-PRED: 1.89 L/SEC
FEFMAX-%PRED-PRE: 50 %
FEFMAX-PRE: 2.91 L/SEC
FEFMAX-PRED: 5.73 L/SEC
FEV1-%PRED-PRE: 46 %
FEV1-PRE: 0.97 L
FEV1FEV6-PRE: 58 %
FEV1FEV6-PRED: 80 %
FEV1FVC-PRE: 56 %
FEV1FVC-PRED: 80 %
FEV1SVC-PRE: 53 %
FEV1SVC-PRED: 70 %
FIFMAX-PRE: 1.77 L/SEC
FRCPLETH-%PRED-PRE: 109 %
FRCPLETH-PRE: 2.84 L
FRCPLETH-PRED: 2.59 L
FVC-%PRED-PRE: 66 %
FVC-PRE: 1.72 L
FVC-PRED: 2.59 L
IC-%PRED-PRE: 81 %
IC-PRE: 1.59 L
IC-PRED: 1.94 L
MYELOPEROXIDASE AB SER IA-ACNC: 2.8 U/ML
MYELOPEROXIDASE AB SER IA-ACNC: NEGATIVE
RVPLETH-%PRED-PRE: 138 %
RVPLETH-PRE: 2.6 L
RVPLETH-PRED: 1.88 L
TLCPLETH-%PRED-PRE: 96 %
TLCPLETH-PRE: 4.42 L
TLCPLETH-PRED: 4.6 L
VA-%PRED-PRE: 70 %
VA-PRE: 3.05 L
VC-%PRED-PRE: 61 %
VC-PRE: 1.82 L
VC-PRED: 2.97 L

## 2024-06-10 ENCOUNTER — TELEPHONE (OUTPATIENT)
Dept: OTOLARYNGOLOGY | Facility: CLINIC | Age: 64
End: 2024-06-10
Payer: COMMERCIAL

## 2024-06-11 PROBLEM — M79.10 MYALGIA: Status: ACTIVE | Noted: 2024-05-23

## 2024-06-11 PROBLEM — R93.89 ABNORMAL FINDING OF DIAGNOSTIC IMAGING: Status: ACTIVE | Noted: 2022-12-20

## 2024-06-11 PROBLEM — D18.01 CHERRY ANGIOMA: Status: ACTIVE | Noted: 2022-12-28

## 2024-06-11 PROBLEM — F32.A DEPRESSION: Status: ACTIVE | Noted: 2020-09-21

## 2024-06-11 PROBLEM — M75.100 ROTATOR CUFF TEAR: Status: ACTIVE | Noted: 2022-12-28

## 2024-06-11 PROBLEM — N81.6 RECTOCELE: Status: ACTIVE | Noted: 2022-07-20

## 2024-06-11 PROBLEM — J44.9 MODERATE COPD (CHRONIC OBSTRUCTIVE PULMONARY DISEASE) (H): Status: ACTIVE | Noted: 2023-12-07

## 2024-06-11 PROBLEM — L82.1 SEBORRHEIC KERATOSES: Status: ACTIVE | Noted: 2022-07-26

## 2024-06-11 PROBLEM — J42 BRONCHITIS, CHRONIC (H): Chronic | Status: ACTIVE | Noted: 2023-11-16

## 2024-06-11 PROBLEM — M25.50 PAIN IN JOINT: Status: ACTIVE | Noted: 2024-05-23

## 2024-06-11 PROBLEM — K51.20 CHRONIC ULCERATIVE PROCTITIS (H): Chronic | Status: ACTIVE | Noted: 2023-11-16

## 2024-06-11 PROBLEM — R06.02 SHORTNESS OF BREATH: Status: ACTIVE | Noted: 2024-05-23

## 2024-06-11 PROBLEM — L81.4 LENTIGINES: Status: ACTIVE | Noted: 2022-12-28

## 2024-06-11 PROBLEM — E04.9 GOITER: Status: ACTIVE | Noted: 2020-02-10

## 2024-06-11 PROBLEM — G43.009 MIGRAINE WITHOUT AURA AND WITHOUT STATUS MIGRAINOSUS, NOT INTRACTABLE: Chronic | Status: ACTIVE | Noted: 2017-02-22

## 2024-06-11 PROBLEM — R06.1 STRIDOR: Status: ACTIVE | Noted: 2024-05-23

## 2024-06-11 PROBLEM — R05.3 CHRONIC COUGH: Chronic | Status: ACTIVE | Noted: 2022-12-19

## 2024-06-11 PROBLEM — G44.52 NEW PERSISTENT DAILY HEADACHE: Status: ACTIVE | Noted: 2024-05-23

## 2024-06-11 PROBLEM — D22.9 MULTIPLE BENIGN NEVI: Status: ACTIVE | Noted: 2022-07-26

## 2024-06-11 PROBLEM — R91.1 PULMONARY NODULE: Status: ACTIVE | Noted: 2022-07-26

## 2024-06-11 PROBLEM — E78.5 HYPERLIPIDEMIA: Chronic | Status: ACTIVE | Noted: 2018-03-05

## 2024-06-11 PROBLEM — M31.7 MICROSCOPIC POLYANGIITIS (H): Chronic | Status: ACTIVE | Noted: 2024-03-28

## 2024-06-11 PROBLEM — G60.8 SENSORY PERIPHERAL NEUROPATHY: Status: ACTIVE | Noted: 2024-04-29

## 2024-06-11 RX ORDER — ROFLUMILAST 500 UG/1
1 TABLET ORAL
Status: ON HOLD | COMMUNITY
Start: 2023-12-05 | End: 2024-07-20

## 2024-06-11 RX ORDER — PREDNISONE 10 MG/1
TABLET ORAL
Status: ON HOLD | COMMUNITY
Start: 2024-04-29 | End: 2024-07-20

## 2024-06-11 RX ORDER — BUDESONIDE 0.5 MG/2ML
INHALANT ORAL
Status: ON HOLD | COMMUNITY
Start: 2023-08-24 | End: 2024-07-20

## 2024-06-11 RX ORDER — FLUTICASONE PROPIONATE 50 MCG
SPRAY, SUSPENSION (ML) NASAL
Status: ON HOLD | COMMUNITY
Start: 2023-12-08 | End: 2024-07-20

## 2024-06-11 RX ORDER — MONTELUKAST SODIUM 10 MG/1
1 TABLET ORAL AT BEDTIME
Status: ON HOLD | COMMUNITY
Start: 2023-08-24 | End: 2024-07-20

## 2024-06-11 RX ORDER — INHALER,ASSIST DEVICE,LG MASK
SPACER (EA) MISCELLANEOUS SEE ADMIN INSTRUCTIONS
Status: ON HOLD | COMMUNITY
Start: 2023-12-22 | End: 2024-07-20

## 2024-06-11 NOTE — TELEPHONE ENCOUNTER
RECORDS STATUS - ALL OTHER DIAGNOSIS      RECORDS RECEIVED FROM:    Appt Date: 6/13/2024    MPA (microscopic polyangiitis) (H)     Action    Action Taken 6/11/2024 3:19pm FRANKO     I called [facility] IM[facility] dept [phone] - I was transferred to [phone]- [facility]. I called pt [phone] - I was transferred again..       NOTES STATUS DETAILS   OFFICE NOTE from referring provider  Jorge Bailey MD    OFFICE NOTE from medical oncologist Complete Pulmonology Records are in EPIC    DISCHARGE SUMMARY from hospital     DISCHARGE REPORT from the ER     OPERATIVE REPORT Complete General PFT 6/6/2024 more in EPIC   MEDICATION LIST Complete [facility]   CLINICAL TRIAL TREATMENTS TO DATE     LABS     PATHOLOGY REPORTS Path Consult in [facility] 3/21/2024   CASE FROM [place] ([id number], OBTAINED 03/13/24):  A. BRONCHUS, RIGHT LOWER LOBE, TRANSBRONCHIAL BIOPSY:  - Fragments of non-necrotizing granulomatous inflammation with giant cell reaction involving bronchial wall  - No evidence of vasculitis or malignancy   ANYTHING RELATED TO DIAGNOSIS Complete Labs last updated on 6/6/2024    PATHOLOGY FEDEX TRACKING   TRACKING #:   GENONOMIC TESTING     TYPE:     IMAGING (NEED IMAGES & REPORT)     CT SCANS Complete CT Chest 6/6/2024     CT Soft Tissue Neck 6/3/2024     CTA Chest 5/24/2024    More in PACS   XRAYS Complete    In PACS-  [facility]  Xray Chest 5/17/2024     Xray Chest 1/5/2024 ([facility])      ULTRASOUND     PET     IMAGE DISC FEDEX TRACKING   TRACKING #:

## 2024-06-13 ENCOUNTER — VIRTUAL VISIT (OUTPATIENT)
Dept: PULMONOLOGY | Facility: CLINIC | Age: 64
End: 2024-06-13
Attending: INTERNAL MEDICINE
Payer: COMMERCIAL

## 2024-06-13 ENCOUNTER — PRE VISIT (OUTPATIENT)
Dept: PULMONOLOGY | Facility: CLINIC | Age: 64
End: 2024-06-13
Payer: COMMERCIAL

## 2024-06-13 VITALS — BODY MASS INDEX: 23.19 KG/M2 | HEIGHT: 62 IN | WEIGHT: 126 LBS

## 2024-06-13 DIAGNOSIS — M31.7 MPA (MICROSCOPIC POLYANGIITIS) (H): ICD-10-CM

## 2024-06-13 PROCEDURE — 99214 OFFICE O/P EST MOD 30 MIN: CPT | Mod: 95 | Performed by: STUDENT IN AN ORGANIZED HEALTH CARE EDUCATION/TRAINING PROGRAM

## 2024-06-13 RX ORDER — CEFAZOLIN SODIUM 2 G/50ML
2 SOLUTION INTRAVENOUS SEE ADMIN INSTRUCTIONS
Status: CANCELLED | OUTPATIENT
Start: 2024-06-13

## 2024-06-13 RX ORDER — CEFAZOLIN SODIUM 2 G/50ML
2 SOLUTION INTRAVENOUS
Status: CANCELLED | OUTPATIENT
Start: 2024-06-13

## 2024-06-13 RX ORDER — METRONIDAZOLE 500 MG/100ML
500 INJECTION, SOLUTION INTRAVENOUS
Status: CANCELLED | OUTPATIENT
Start: 2024-06-13

## 2024-06-13 ASSESSMENT — PAIN SCALES - GENERAL: PAINLEVEL: NO PAIN (0)

## 2024-06-13 NOTE — LETTER
6/13/2024       RE: Rose Krishna  [address] [place] [address]     Dear Colleague,    Thank you for referring your patient, Rose Krishna, to the [facility] at [facility]. Please see a copy of my visit note below.    Virtual Visit Details    Type of service:  Video Visit     Originating Location (pt. Location): Home    Distant Location (provider location):  On-site  Platform used for Video Visit: [facility]    LUNG NODULE & INTERVENTIONAL PULMONARY CLINIC  [facility]    Rose Krishna MRN# [id number]   Age: 64 year old YOB: 1960     Reason for Consultation: BI stenosis    Requesting Physician: Jorge Bailey MD  [address] [place] [id number]     Assessment and Plan:    Rose Krishna is a 64 year old female who presents for evaluation of bronchial stenosis in the setting of ANCA vasculitis.     I independently reviewed their CT scan from 6/6/24 which reveals mild narrowing of her bronchus intermedisu. Clinically, she has to stop talking after a few words to take a breath so I can certainly appreciate there is some stenosis in her airways.     Plan for rigid bronchoscopy, possible balloon dilation, possible steroid injection, possible CO2 laser. Please also perform biopsy of the bronchial wall and send for culture.     Her PCP will no her PAC.    Patient indicated understanding and agreed to the plan of care. All questions answered.     Deepak Cole DO   of Medicine  Interventional Pulmonology  Department of Pulmonary, Allergy, Critical Care and Sleep Medicine   [facility]     History:  Rose Krishna is a 64 year old female who presents for evaluation of airway stenosis.   Pmhx of ANCA vasculitis. Diagnosed March 2024. Had a bronchoscopy in [facility] on 3/13/24 which showed 'abnormality' in her right lung. She has completed 4  rituximab and 3 solumedrol doses. She is more short of breath despite this. She is on tavneous now which is an oral immunusuppresent. Had ENT scope her in [place] recently, which did not reveal any supraglotic stenosis. Other PMHx includes A fib  Cough? yes  Shortness of breath? Yes  Smoking history: none  Prior history of lung problems: Yes, ANCA vasculitis.   Family history: Yes, mom had mylodysplastic syndrome. Dad has prostate cancer.  Exposure to fungus/mold: No  Exposure to tuberculosis: No  Radon exposure: no  Asbestos exposure: no  Uranium exposure: No  Immunosuppressed? Yes  Difficulty swallowing? No  Weight loss? Yes 15lbs in 1 year  Night sweats? No  Fever or chills? No    Medications:  Current Outpatient Medications   Medication Sig Dispense Refill     aerochamber plus with mask - large/blue/>5 years See Admin Instructions use with inhaler       budesonide (PULMICORT) 0.5 MG/2ML neb solution INHALE 1 VIAL VIA NEBULIZER TWICE A DAY RINSE MOUTH AFTER USE       dexAMETHasone (DECADRON) 2 MG tablet Take 5 tablets (10 mg) by mouth daily (with breakfast) for 5 days, THEN 4 tablets (8 mg) daily (with breakfast) for 5 days, THEN 3 tablets (6 mg) daily (with breakfast) for 5 days, THEN 2 tablets (4 mg) daily (with breakfast) for 5 days, THEN 1 tablet (2 mg) daily (with breakfast) for 5 days, THEN 0.5 tablets (1 mg) daily (with breakfast) for 5 days. 78 tablet 0     estradiol (VIVELLE-DOT) 0.0375 MG/24HR BIW patch Place 1 patch onto the skin       fludrocortisone (FLORINEF) 0.1 MG tablet Take 1 tablet (0.1 mg) by mouth daily for 5 days, THEN 1 tablet (0.1 mg) every 48 hours for 10 days. 10 tablet 0     fluticasone (FLONASE) 50 MCG/ACT nasal spray INHALE 2 SPRAYS IN EACH NOSTRIL 1 TIME PER DAY; AFTER 1 WEEK, MAY ADJUST TO 1 - 2 SPRAYS IN EACH NOSTRIL 1 TIME PER DAY.       levothyroxine (SYNTHROID/LEVOTHROID) 100 MCG tablet Take 100 mcg by mouth       montelukast (SINGULAIR) 10 MG tablet Take 1 tablet by mouth at  "bedtime       omeprazole (PRILOSEC) 20 MG DR capsule Take 20 mg by mouth       predniSONE (DELTASONE) 10 MG tablet TAKE 4 TABLETS (40 MG) BY MOUTH 1 TIME PER DAY       predniSONE (DELTASONE) 20 MG tablet Take 60 mg by mouth daily       roflumilast (DALIRESP) 500 MCG TABS tablet Take 1 tablet by mouth daily at 2 pm       sulfamethoxazole-trimethoprim (BACTRIM DS) 800-160 MG tablet Take 1 tablet by mouth 2 times daily for 7 days 14 tablet 0     TAVNEOS 10 MG CAPS Take 30 mg by mouth       valACYclovir (VALTREX) 500 MG tablet Take 500 mg by mouth as needed       No current facility-administered medications for this visit.         Physical exam:  Ht 1.575 m (5' 2\")   Wt 57.2 kg (126 lb)   BMI 23.05 kg/m    Wt Readings from Last 4 Encounters:   06/13/24 57.2 kg (126 lb)   06/06/24 59.9 kg (132 lb)   03/21/24 59.7 kg (131 lb 9.6 oz)     General: Well appearing, nonlabored breathing  Neuro: Answering questions appropriately  Psych: Normal affect     Again, thank you for allowing me to participate in the care of your patient.      Sincerely,    Deepak Cole, DO    "

## 2024-06-13 NOTE — PROGRESS NOTES
Virtual Visit Details    Type of service:  Video Visit     Originating Location (pt. Location): Home    Distant Location (provider location):  On-site  Platform used for Video Visit: [facility]    LUNG NODULE & INTERVENTIONAL PULMONARY CLINIC  [facility]    Rose Krishna MRN# [id number]   Age: 64 year old YOB: 1960     Reason for Consultation: BI stenosis    Requesting Physician: Jorge Bailey MD  [address] [place] [address]     Assessment and Plan:    Rose Krishna is a 64 year old female who presents for evaluation of bronchial stenosis in the setting of ANCA vasculitis.     I independently reviewed their CT scan from 6/6/24 which reveals mild narrowing of her bronchus intermedisu. Clinically, she has to stop talking after a few words to take a breath so I can certainly appreciate there is some stenosis in her airways.     Plan for rigid bronchoscopy, possible balloon dilation, possible steroid injection, possible CO2 laser. Please also perform biopsy of the bronchial wall and send for culture.     Her PCP will no her PAC.    Patient indicated understanding and agreed to the plan of care. All questions answered.     Deepak Cole DO   of Medicine  Interventional Pulmonology  Department of Pulmonary, Allergy, Critical Care and Sleep Medicine   [facility]     History:  Rose Krishna is a 64 year old female who presents for evaluation of airway stenosis.   Pmhx of ANCA vasculitis. Diagnosed March 2024. Had a bronchoscopy in [facility] on 3/13/24 which showed 'abnormality' in her right lung. She has completed 4 rituximab and 3 solumedrol doses. She is more short of breath despite this. She is on tavneous now which is an oral immunusuppresent. Had ENT scope her in [place] recently, which did not reveal any supraglotic stenosis. Other PMHx includes A fib  Cough? yes  Shortness of breath? Yes  Smoking history: none  Prior  history of lung problems: Yes, ANCA vasculitis.   Family history: Yes, mom had mylodysplastic syndrome. Dad has prostate cancer.  Exposure to fungus/mold: No  Exposure to tuberculosis: No  Radon exposure: no  Asbestos exposure: no  Uranium exposure: No  Immunosuppressed? Yes  Difficulty swallowing? No  Weight loss? Yes 15lbs in 1 year  Night sweats? No  Fever or chills? No    Medications:  Current Outpatient Medications   Medication Sig Dispense Refill    aerochamber plus with mask - large/blue/>5 years See Admin Instructions use with inhaler      budesonide (PULMICORT) 0.5 MG/2ML neb solution INHALE 1 VIAL VIA NEBULIZER TWICE A DAY RINSE MOUTH AFTER USE      dexAMETHasone (DECADRON) 2 MG tablet Take 5 tablets (10 mg) by mouth daily (with breakfast) for 5 days, THEN 4 tablets (8 mg) daily (with breakfast) for 5 days, THEN 3 tablets (6 mg) daily (with breakfast) for 5 days, THEN 2 tablets (4 mg) daily (with breakfast) for 5 days, THEN 1 tablet (2 mg) daily (with breakfast) for 5 days, THEN 0.5 tablets (1 mg) daily (with breakfast) for 5 days. 78 tablet 0    estradiol (VIVELLE-DOT) 0.0375 MG/24HR BIW patch Place 1 patch onto the skin      fludrocortisone (FLORINEF) 0.1 MG tablet Take 1 tablet (0.1 mg) by mouth daily for 5 days, THEN 1 tablet (0.1 mg) every 48 hours for 10 days. 10 tablet 0    fluticasone (FLONASE) 50 MCG/ACT nasal spray INHALE 2 SPRAYS IN EACH NOSTRIL 1 TIME PER DAY; AFTER 1 WEEK, MAY ADJUST TO 1 - 2 SPRAYS IN EACH NOSTRIL 1 TIME PER DAY.      levothyroxine (SYNTHROID/LEVOTHROID) 100 MCG tablet Take 100 mcg by mouth      montelukast (SINGULAIR) 10 MG tablet Take 1 tablet by mouth at bedtime      omeprazole (PRILOSEC) 20 MG DR capsule Take 20 mg by mouth      predniSONE (DELTASONE) 10 MG tablet TAKE 4 TABLETS (40 MG) BY MOUTH 1 TIME PER DAY      predniSONE (DELTASONE) 20 MG tablet Take 60 mg by mouth daily      roflumilast (DALIRESP) 500 MCG TABS tablet Take 1 tablet by mouth daily at 2 pm       "sulfamethoxazole-trimethoprim (BACTRIM DS) 800-160 MG tablet Take 1 tablet by mouth 2 times daily for 7 days 14 tablet 0    TAVNEOS 10 MG CAPS Take 30 mg by mouth      valACYclovir (VALTREX) 500 MG tablet Take 500 mg by mouth as needed       No current facility-administered medications for this visit.         Physical exam:  Ht 1.575 m (5' 2\")   Wt 57.2 kg (126 lb)   BMI 23.05 kg/m    Wt Readings from Last 4 Encounters:   06/13/24 57.2 kg (126 lb)   06/06/24 59.9 kg (132 lb)   03/21/24 59.7 kg (131 lb 9.6 oz)     General: Well appearing, nonlabored breathing  Neuro: Answering questions appropriately  Psych: Normal affect       "

## 2024-06-13 NOTE — NURSING NOTE
Is the patient currently in the state of [place]? YES    Visit mode:VIDEO    If the visit is dropped, the patient can be reconnected by: VIDEO VISIT: Text to cell phone:   Telephone Information:   Mobile [phone]       Will anyone else be joining the visit? NO  (If patient encounters technical issues they should call [phone]    How would you like to obtain your AVS? MyChart    Are changes needed to the allergy or medication list? No    Are refills needed on medications prescribed by this physician? NO    Reason for visit: Consult    Angelica CHAPMAN

## 2024-06-15 DIAGNOSIS — J04.30 SUPRAGLOTTITIS WITHOUT AIRWAY OBSTRUCTION: ICD-10-CM

## 2024-06-15 DIAGNOSIS — I77.82 ANCA-ASSOCIATED VASCULITIS (H): ICD-10-CM

## 2024-06-15 DIAGNOSIS — J38.6 SUPRAGLOTTIC STENOSIS: ICD-10-CM

## 2024-06-17 RX ORDER — SULFAMETHOXAZOLE/TRIMETHOPRIM 800-160 MG
1 TABLET ORAL 2 TIMES DAILY
Qty: 14 TABLET | Refills: 0 | Status: SHIPPED | OUTPATIENT
Start: 2024-06-17 | End: 2024-06-24

## 2024-06-18 DIAGNOSIS — I77.82 ANCA-ASSOCIATED VASCULITIS (H): ICD-10-CM

## 2024-06-18 DIAGNOSIS — J38.6 SUPRAGLOTTIC STENOSIS: ICD-10-CM

## 2024-06-18 DIAGNOSIS — J04.30 SUPRAGLOTTITIS WITHOUT AIRWAY OBSTRUCTION: ICD-10-CM

## 2024-06-18 RX ORDER — FLUDROCORTISONE ACETATE 0.1 MG/1
TABLET ORAL
Qty: 10 TABLET | Refills: 0 | Status: ON HOLD | OUTPATIENT
Start: 2024-06-18 | End: 2024-07-20

## 2024-06-26 DIAGNOSIS — J04.30 SUPRAGLOTTITIS WITHOUT AIRWAY OBSTRUCTION: ICD-10-CM

## 2024-06-26 DIAGNOSIS — J38.6 SUPRAGLOTTIC STENOSIS: ICD-10-CM

## 2024-06-26 DIAGNOSIS — I77.82 ANCA-ASSOCIATED VASCULITIS (H): ICD-10-CM

## 2024-06-26 RX ORDER — DEXAMETHASONE 2 MG/1
TABLET ORAL
Qty: 10 TABLET | Refills: 0 | Status: ON HOLD | OUTPATIENT
Start: 2024-06-26 | End: 2024-07-20

## 2024-07-01 ENCOUNTER — MYC MEDICAL ADVICE (OUTPATIENT)
Dept: PULMONOLOGY | Facility: CLINIC | Age: 64
End: 2024-07-01
Payer: COMMERCIAL

## 2024-07-05 ENCOUNTER — MYC MEDICAL ADVICE (OUTPATIENT)
Dept: PULMONOLOGY | Facility: CLINIC | Age: 64
End: 2024-07-05
Payer: COMMERCIAL

## 2024-07-16 PROBLEM — R07.89 ATYPICAL CHEST PAIN: Status: ACTIVE | Noted: 2024-06-18

## 2024-07-16 PROBLEM — I77.810 ASCENDING AORTA DILATATION (H): Chronic | Status: ACTIVE | Noted: 2024-06-24

## 2024-07-18 ENCOUNTER — ANESTHESIA EVENT (OUTPATIENT)
Dept: SURGERY | Facility: CLINIC | Age: 64
End: 2024-07-18
Payer: COMMERCIAL

## 2024-07-19 ENCOUNTER — ANESTHESIA (OUTPATIENT)
Dept: SURGERY | Facility: CLINIC | Age: 64
End: 2024-07-19
Payer: COMMERCIAL

## 2024-07-19 ENCOUNTER — APPOINTMENT (OUTPATIENT)
Dept: GENERAL RADIOLOGY | Facility: CLINIC | Age: 64
End: 2024-07-19
Attending: INTERNAL MEDICINE
Payer: COMMERCIAL

## 2024-07-19 ENCOUNTER — HOSPITAL ENCOUNTER (INPATIENT)
Facility: CLINIC | Age: 64
LOS: 4 days | Discharge: HOME | End: 2024-07-23
Attending: INTERNAL MEDICINE | Admitting: INTERNAL MEDICINE
Payer: COMMERCIAL

## 2024-07-19 DIAGNOSIS — E53.8 FOLATE DEFICIENCY: ICD-10-CM

## 2024-07-19 DIAGNOSIS — I77.6 VASCULITIS (H): ICD-10-CM

## 2024-07-19 DIAGNOSIS — J96.21 ACUTE ON CHRONIC RESPIRATORY FAILURE WITH HYPOXIA (H): Primary | ICD-10-CM

## 2024-07-19 PROBLEM — J96.90 RESPIRATORY FAILURE (H): Status: ACTIVE | Noted: 2024-07-19

## 2024-07-19 LAB
KOH PREPARATION: NORMAL
KOH PREPARATION: NORMAL

## 2024-07-19 PROCEDURE — 999N000179 XR SURGERY CARM FLUORO LESS THAN 5 MIN W STILLS: Mod: TC

## 2024-07-19 PROCEDURE — 0B738ZZ DILATION OF RIGHT MAIN BRONCHUS, VIA NATURAL OR ARTIFICIAL OPENING ENDOSCOPIC: ICD-10-PCS | Performed by: INTERNAL MEDICINE

## 2024-07-19 PROCEDURE — 250N000009 HC RX 250: Performed by: ANESTHESIOLOGY

## 2024-07-19 PROCEDURE — 88312 SPECIAL STAINS GROUP 1: CPT | Mod: TC | Performed by: INTERNAL MEDICINE

## 2024-07-19 PROCEDURE — 250N000013 HC RX MED GY IP 250 OP 250 PS 637

## 2024-07-19 PROCEDURE — 250N000011 HC RX IP 250 OP 636: Performed by: INTERNAL MEDICINE

## 2024-07-19 PROCEDURE — 710N000010 HC RECOVERY PHASE 1, LEVEL 2, PER MIN: Performed by: INTERNAL MEDICINE

## 2024-07-19 PROCEDURE — 258N000003 HC RX IP 258 OP 636: Performed by: INTERNAL MEDICINE

## 2024-07-19 PROCEDURE — 87210 SMEAR WET MOUNT SALINE/INK: CPT | Performed by: INTERNAL MEDICINE

## 2024-07-19 PROCEDURE — 31630 BRONCHOSCOPY DILATE/FX REPR: CPT | Performed by: INTERNAL MEDICINE

## 2024-07-19 PROCEDURE — 88312 SPECIAL STAINS GROUP 1: CPT | Mod: 26 | Performed by: STUDENT IN AN ORGANIZED HEALTH CARE EDUCATION/TRAINING PROGRAM

## 2024-07-19 PROCEDURE — 250N000013 HC RX MED GY IP 250 OP 250 PS 637: Performed by: ANESTHESIOLOGY

## 2024-07-19 PROCEDURE — 87102 FUNGUS ISOLATION CULTURE: CPT | Performed by: INTERNAL MEDICINE

## 2024-07-19 PROCEDURE — 88305 TISSUE EXAM BY PATHOLOGIST: CPT | Mod: 26 | Performed by: STUDENT IN AN ORGANIZED HEALTH CARE EDUCATION/TRAINING PROGRAM

## 2024-07-19 PROCEDURE — 31641 BRONCHOSCOPY TREAT BLOCKAGE: CPT | Performed by: ANESTHESIOLOGY

## 2024-07-19 PROCEDURE — 99255 IP/OBS CONSLTJ NEW/EST HI 80: CPT | Mod: GC | Performed by: INTERNAL MEDICINE

## 2024-07-19 PROCEDURE — 370N000017 HC ANESTHESIA TECHNICAL FEE, PER MIN: Performed by: INTERNAL MEDICINE

## 2024-07-19 PROCEDURE — 360N000084 HC SURGERY LEVEL 4 W/ FLUORO, PER MIN: Performed by: INTERNAL MEDICINE

## 2024-07-19 PROCEDURE — 87206 SMEAR FLUORESCENT/ACID STAI: CPT | Performed by: INTERNAL MEDICINE

## 2024-07-19 PROCEDURE — 272N000001 HC OR GENERAL SUPPLY STERILE: Performed by: INTERNAL MEDICINE

## 2024-07-19 PROCEDURE — 999N000065 XR CHEST PORT 1 VIEW

## 2024-07-19 PROCEDURE — 31641 BRONCHOSCOPY TREAT BLOCKAGE: CPT | Performed by: REGISTERED NURSE

## 2024-07-19 PROCEDURE — 999N000141 HC STATISTIC PRE-PROCEDURE NURSING ASSESSMENT: Performed by: INTERNAL MEDICINE

## 2024-07-19 PROCEDURE — 0BB38ZX EXCISION OF RIGHT MAIN BRONCHUS, VIA NATURAL OR ARTIFICIAL OPENING ENDOSCOPIC, DIAGNOSTIC: ICD-10-PCS | Performed by: INTERNAL MEDICINE

## 2024-07-19 PROCEDURE — 71045 X-RAY EXAM CHEST 1 VIEW: CPT | Mod: 26 | Performed by: RADIOLOGY

## 2024-07-19 PROCEDURE — 250N000011 HC RX IP 250 OP 636: Performed by: REGISTERED NURSE

## 2024-07-19 PROCEDURE — 99223 1ST HOSP IP/OBS HIGH 75: CPT | Mod: GC | Performed by: INTERNAL MEDICINE

## 2024-07-19 PROCEDURE — 250N000013 HC RX MED GY IP 250 OP 250 PS 637: Performed by: INTERNAL MEDICINE

## 2024-07-19 PROCEDURE — C1726 CATH, BAL DIL, NON-VASCULAR: HCPCS | Performed by: INTERNAL MEDICINE

## 2024-07-19 PROCEDURE — 31625 BRONCHOSCOPY W/BIOPSY(S): CPT | Performed by: INTERNAL MEDICINE

## 2024-07-19 PROCEDURE — 255N000002 HC RX 255 OP 636: Performed by: INTERNAL MEDICINE

## 2024-07-19 PROCEDURE — 250N000011 HC RX IP 250 OP 636: Performed by: ANESTHESIOLOGY

## 2024-07-19 PROCEDURE — 120N000002 HC R&B MED SURG/OB UMMC

## 2024-07-19 PROCEDURE — 258N000003 HC RX IP 258 OP 636: Performed by: ANESTHESIOLOGY

## 2024-07-19 PROCEDURE — 0B748ZZ DILATION OF RIGHT UPPER LOBE BRONCHUS, VIA NATURAL OR ARTIFICIAL OPENING ENDOSCOPIC: ICD-10-PCS | Performed by: INTERNAL MEDICINE

## 2024-07-19 PROCEDURE — 87070 CULTURE OTHR SPECIMN AEROBIC: CPT | Performed by: INTERNAL MEDICINE

## 2024-07-19 RX ORDER — VALACYCLOVIR HYDROCHLORIDE 500 MG/1
500 TABLET, FILM COATED ORAL DAILY
Status: DISCONTINUED | OUTPATIENT
Start: 2024-07-19 | End: 2024-07-19

## 2024-07-19 RX ORDER — LIDOCAINE 40 MG/G
CREAM TOPICAL
Status: DISCONTINUED | OUTPATIENT
Start: 2024-07-19 | End: 2024-07-23 | Stop reason: HOSPADM

## 2024-07-19 RX ORDER — AMOXICILLIN 250 MG
2 CAPSULE ORAL 2 TIMES DAILY PRN
Status: DISCONTINUED | OUTPATIENT
Start: 2024-07-19 | End: 2024-07-23 | Stop reason: HOSPADM

## 2024-07-19 RX ORDER — HYDRALAZINE HYDROCHLORIDE 20 MG/ML
2.5-5 INJECTION INTRAMUSCULAR; INTRAVENOUS EVERY 10 MIN PRN
Status: DISCONTINUED | OUTPATIENT
Start: 2024-07-19 | End: 2024-07-19 | Stop reason: HOSPADM

## 2024-07-19 RX ORDER — SODIUM CHLORIDE, SODIUM LACTATE, POTASSIUM CHLORIDE, CALCIUM CHLORIDE 600; 310; 30; 20 MG/100ML; MG/100ML; MG/100ML; MG/100ML
INJECTION, SOLUTION INTRAVENOUS CONTINUOUS PRN
Status: DISCONTINUED | OUTPATIENT
Start: 2024-07-19 | End: 2024-07-19

## 2024-07-19 RX ORDER — METRONIDAZOLE 500 MG/100ML
500 INJECTION, SOLUTION INTRAVENOUS
Status: DISCONTINUED | OUTPATIENT
Start: 2024-07-19 | End: 2024-07-19 | Stop reason: HOSPADM

## 2024-07-19 RX ORDER — FLUTICASONE PROPIONATE 50 MCG
1-2 SPRAY, SUSPENSION (ML) NASAL DAILY PRN
Status: DISCONTINUED | OUTPATIENT
Start: 2024-07-19 | End: 2024-07-23 | Stop reason: HOSPADM

## 2024-07-19 RX ORDER — LEVOTHYROXINE SODIUM 100 UG/1
100 TABLET ORAL DAILY
Status: DISCONTINUED | OUTPATIENT
Start: 2024-07-19 | End: 2024-07-19

## 2024-07-19 RX ORDER — FLUDROCORTISONE ACETATE 0.1 MG/1
0.1 TABLET ORAL EVERY OTHER DAY
Status: DISCONTINUED | OUTPATIENT
Start: 2024-07-19 | End: 2024-07-19

## 2024-07-19 RX ORDER — OXYCODONE HYDROCHLORIDE 5 MG/1
5 TABLET ORAL
Status: DISCONTINUED | OUTPATIENT
Start: 2024-07-19 | End: 2024-07-19 | Stop reason: HOSPADM

## 2024-07-19 RX ORDER — ONDANSETRON 2 MG/ML
INJECTION INTRAMUSCULAR; INTRAVENOUS PRN
Status: DISCONTINUED | OUTPATIENT
Start: 2024-07-19 | End: 2024-07-19

## 2024-07-19 RX ORDER — IOPAMIDOL 408 MG/ML
INJECTION, SOLUTION INTRAVASCULAR PRN
Status: DISCONTINUED | OUTPATIENT
Start: 2024-07-19 | End: 2024-07-19 | Stop reason: HOSPADM

## 2024-07-19 RX ORDER — MONTELUKAST SODIUM 10 MG/1
10 TABLET ORAL AT BEDTIME
Status: DISCONTINUED | OUTPATIENT
Start: 2024-07-19 | End: 2024-07-19

## 2024-07-19 RX ORDER — TRIAMCINOLONE ACETONIDE 40 MG/ML
INJECTION, SUSPENSION INTRA-ARTICULAR; INTRAMUSCULAR PRN
Status: DISCONTINUED | OUTPATIENT
Start: 2024-07-19 | End: 2024-07-19 | Stop reason: HOSPADM

## 2024-07-19 RX ORDER — HYDROMORPHONE HCL IN WATER/PF 6 MG/30 ML
0.4 PATIENT CONTROLLED ANALGESIA SYRINGE INTRAVENOUS EVERY 5 MIN PRN
Status: DISCONTINUED | OUTPATIENT
Start: 2024-07-19 | End: 2024-07-19 | Stop reason: HOSPADM

## 2024-07-19 RX ORDER — PROPOFOL 10 MG/ML
INJECTION, EMULSION INTRAVENOUS CONTINUOUS PRN
Status: DISCONTINUED | OUTPATIENT
Start: 2024-07-19 | End: 2024-07-19

## 2024-07-19 RX ORDER — FLUTICASONE PROPIONATE 50 MCG
1-2 SPRAY, SUSPENSION (ML) NASAL DAILY
Status: DISCONTINUED | OUTPATIENT
Start: 2024-07-19 | End: 2024-07-19

## 2024-07-19 RX ORDER — POLYETHYLENE GLYCOL 3350 17 G/17G
17 POWDER, FOR SOLUTION ORAL 2 TIMES DAILY PRN
Status: DISCONTINUED | OUTPATIENT
Start: 2024-07-19 | End: 2024-07-23 | Stop reason: HOSPADM

## 2024-07-19 RX ORDER — ACETAMINOPHEN 325 MG/1
650 TABLET ORAL EVERY 4 HOURS PRN
Status: DISCONTINUED | OUTPATIENT
Start: 2024-07-19 | End: 2024-07-19 | Stop reason: HOSPADM

## 2024-07-19 RX ORDER — SODIUM CHLORIDE, SODIUM LACTATE, POTASSIUM CHLORIDE, CALCIUM CHLORIDE 600; 310; 30; 20 MG/100ML; MG/100ML; MG/100ML; MG/100ML
INJECTION, SOLUTION INTRAVENOUS CONTINUOUS
Status: DISCONTINUED | OUTPATIENT
Start: 2024-07-19 | End: 2024-07-19 | Stop reason: HOSPADM

## 2024-07-19 RX ORDER — VALACYCLOVIR HYDROCHLORIDE 500 MG/1
500 TABLET, FILM COATED ORAL EVERY 12 HOURS SCHEDULED
Status: DISCONTINUED | OUTPATIENT
Start: 2024-07-19 | End: 2024-07-23 | Stop reason: HOSPADM

## 2024-07-19 RX ORDER — FENTANYL CITRATE 50 UG/ML
INJECTION, SOLUTION INTRAMUSCULAR; INTRAVENOUS PRN
Status: DISCONTINUED | OUTPATIENT
Start: 2024-07-19 | End: 2024-07-19

## 2024-07-19 RX ORDER — ONDANSETRON 2 MG/ML
4 INJECTION INTRAMUSCULAR; INTRAVENOUS EVERY 30 MIN PRN
Status: DISCONTINUED | OUTPATIENT
Start: 2024-07-19 | End: 2024-07-19 | Stop reason: HOSPADM

## 2024-07-19 RX ORDER — LEVOTHYROXINE SODIUM 112 UG/1
112 TABLET ORAL DAILY
Status: DISCONTINUED | OUTPATIENT
Start: 2024-07-20 | End: 2024-07-23 | Stop reason: HOSPADM

## 2024-07-19 RX ORDER — FENTANYL CITRATE 50 UG/ML
50 INJECTION, SOLUTION INTRAMUSCULAR; INTRAVENOUS EVERY 5 MIN PRN
Status: DISCONTINUED | OUTPATIENT
Start: 2024-07-19 | End: 2024-07-19 | Stop reason: HOSPADM

## 2024-07-19 RX ORDER — PANTOPRAZOLE SODIUM 40 MG/1
40 TABLET, DELAYED RELEASE ORAL
Status: DISCONTINUED | OUTPATIENT
Start: 2024-07-20 | End: 2024-07-23 | Stop reason: HOSPADM

## 2024-07-19 RX ORDER — ALBUTEROL SULFATE 0.83 MG/ML
2.5 SOLUTION RESPIRATORY (INHALATION) EVERY 6 HOURS PRN
Status: DISCONTINUED | OUTPATIENT
Start: 2024-07-19 | End: 2024-07-19 | Stop reason: HOSPADM

## 2024-07-19 RX ORDER — CEFAZOLIN SODIUM/WATER 2 G/20 ML
2 SYRINGE (ML) INTRAVENOUS SEE ADMIN INSTRUCTIONS
Status: DISCONTINUED | OUTPATIENT
Start: 2024-07-19 | End: 2024-07-19 | Stop reason: HOSPADM

## 2024-07-19 RX ORDER — DEXAMETHASONE SODIUM PHOSPHATE 4 MG/ML
INJECTION, SOLUTION INTRA-ARTICULAR; INTRALESIONAL; INTRAMUSCULAR; INTRAVENOUS; SOFT TISSUE PRN
Status: DISCONTINUED | OUTPATIENT
Start: 2024-07-19 | End: 2024-07-19

## 2024-07-19 RX ORDER — DEXAMETHASONE 1 MG
1 TABLET ORAL DAILY
Status: DISCONTINUED | OUTPATIENT
Start: 2024-07-19 | End: 2024-07-19

## 2024-07-19 RX ORDER — FENTANYL CITRATE 50 UG/ML
25 INJECTION, SOLUTION INTRAMUSCULAR; INTRAVENOUS EVERY 5 MIN PRN
Status: DISCONTINUED | OUTPATIENT
Start: 2024-07-19 | End: 2024-07-19 | Stop reason: HOSPADM

## 2024-07-19 RX ORDER — CEFAZOLIN SODIUM/WATER 2 G/20 ML
2 SYRINGE (ML) INTRAVENOUS
Status: DISCONTINUED | OUTPATIENT
Start: 2024-07-19 | End: 2024-07-19 | Stop reason: HOSPADM

## 2024-07-19 RX ORDER — ROFLUMILAST 500 UG/1
500 TABLET ORAL DAILY
Status: DISCONTINUED | OUTPATIENT
Start: 2024-07-19 | End: 2024-07-19

## 2024-07-19 RX ORDER — ESMOLOL HYDROCHLORIDE 10 MG/ML
INJECTION INTRAVENOUS PRN
Status: DISCONTINUED | OUTPATIENT
Start: 2024-07-19 | End: 2024-07-19

## 2024-07-19 RX ORDER — LIDOCAINE 40 MG/G
CREAM TOPICAL
Status: DISCONTINUED | OUTPATIENT
Start: 2024-07-19 | End: 2024-07-19 | Stop reason: HOSPADM

## 2024-07-19 RX ORDER — OXYCODONE HYDROCHLORIDE 10 MG/1
10 TABLET ORAL
Status: DISCONTINUED | OUTPATIENT
Start: 2024-07-19 | End: 2024-07-19 | Stop reason: HOSPADM

## 2024-07-19 RX ORDER — BUDESONIDE 0.5 MG/2ML
0.5 INHALANT ORAL 2 TIMES DAILY
Status: DISCONTINUED | OUTPATIENT
Start: 2024-07-19 | End: 2024-07-19

## 2024-07-19 RX ORDER — PROPOFOL 10 MG/ML
INJECTION, EMULSION INTRAVENOUS PRN
Status: DISCONTINUED | OUTPATIENT
Start: 2024-07-19 | End: 2024-07-19

## 2024-07-19 RX ORDER — LABETALOL HYDROCHLORIDE 5 MG/ML
10 INJECTION, SOLUTION INTRAVENOUS
Status: DISCONTINUED | OUTPATIENT
Start: 2024-07-19 | End: 2024-07-19 | Stop reason: HOSPADM

## 2024-07-19 RX ORDER — ACETAMINOPHEN 325 MG/1
650 TABLET ORAL EVERY 8 HOURS PRN
Status: DISCONTINUED | OUTPATIENT
Start: 2024-07-19 | End: 2024-07-20

## 2024-07-19 RX ORDER — HYDROMORPHONE HCL IN WATER/PF 6 MG/30 ML
0.2 PATIENT CONTROLLED ANALGESIA SYRINGE INTRAVENOUS EVERY 5 MIN PRN
Status: DISCONTINUED | OUTPATIENT
Start: 2024-07-19 | End: 2024-07-19 | Stop reason: HOSPADM

## 2024-07-19 RX ORDER — NALOXONE HYDROCHLORIDE 0.4 MG/ML
0.1 INJECTION, SOLUTION INTRAMUSCULAR; INTRAVENOUS; SUBCUTANEOUS
Status: DISCONTINUED | OUTPATIENT
Start: 2024-07-19 | End: 2024-07-19 | Stop reason: HOSPADM

## 2024-07-19 RX ORDER — DEXAMETHASONE SODIUM PHOSPHATE 4 MG/ML
4 INJECTION, SOLUTION INTRA-ARTICULAR; INTRALESIONAL; INTRAMUSCULAR; INTRAVENOUS; SOFT TISSUE
Status: DISCONTINUED | OUTPATIENT
Start: 2024-07-19 | End: 2024-07-19 | Stop reason: HOSPADM

## 2024-07-19 RX ORDER — PREDNISONE 10 MG/1
10 TABLET ORAL DAILY
Status: DISCONTINUED | OUTPATIENT
Start: 2024-07-19 | End: 2024-07-19

## 2024-07-19 RX ORDER — ONDANSETRON 4 MG/1
4 TABLET, ORALLY DISINTEGRATING ORAL EVERY 30 MIN PRN
Status: DISCONTINUED | OUTPATIENT
Start: 2024-07-19 | End: 2024-07-19 | Stop reason: HOSPADM

## 2024-07-19 RX ORDER — AMOXICILLIN 250 MG
1 CAPSULE ORAL 2 TIMES DAILY PRN
Status: DISCONTINUED | OUTPATIENT
Start: 2024-07-19 | End: 2024-07-23 | Stop reason: HOSPADM

## 2024-07-19 RX ADMIN — LIDOCAINE HYDROCHLORIDE 3 ML: 40 INJECTION, SOLUTION RETROBULBAR; TOPICAL at 10:10

## 2024-07-19 RX ADMIN — Medication 40 MG: at 08:49

## 2024-07-19 RX ADMIN — ACETAMINOPHEN 650 MG: 325 TABLET, FILM COATED ORAL at 20:17

## 2024-07-19 RX ADMIN — ALBUTEROL SULFATE 2.5 MG: 2.5 SOLUTION RESPIRATORY (INHALATION) at 09:50

## 2024-07-19 RX ADMIN — ESMOLOL HYDROCHLORIDE 10 MG: 10 INJECTION, SOLUTION INTRAVENOUS at 08:57

## 2024-07-19 RX ADMIN — FENTANYL CITRATE 25 MCG: 50 INJECTION INTRAMUSCULAR; INTRAVENOUS at 10:01

## 2024-07-19 RX ADMIN — ONDANSETRON 4 MG: 2 INJECTION INTRAMUSCULAR; INTRAVENOUS at 08:53

## 2024-07-19 RX ADMIN — FOSAPREPITANT 150 MG: 150 INJECTION, POWDER, LYOPHILIZED, FOR SOLUTION INTRAVENOUS at 09:14

## 2024-07-19 RX ADMIN — PROPOFOL 140 MG: 10 INJECTION, EMULSION INTRAVENOUS at 08:47

## 2024-07-19 RX ADMIN — SODIUM CHLORIDE, POTASSIUM CHLORIDE, SODIUM LACTATE AND CALCIUM CHLORIDE: 600; 310; 30; 20 INJECTION, SOLUTION INTRAVENOUS at 08:39

## 2024-07-19 RX ADMIN — PROPOFOL 150 MCG/KG/MIN: 10 INJECTION, EMULSION INTRAVENOUS at 08:48

## 2024-07-19 RX ADMIN — VALACYCLOVIR HYDROCHLORIDE 500 MG: 500 TABLET, FILM COATED ORAL at 20:17

## 2024-07-19 RX ADMIN — ESMOLOL HYDROCHLORIDE 10 MG: 10 INJECTION, SOLUTION INTRAVENOUS at 09:20

## 2024-07-19 RX ADMIN — Medication 200 MG: at 09:28

## 2024-07-19 RX ADMIN — FENTANYL CITRATE 100 MCG: 50 INJECTION INTRAMUSCULAR; INTRAVENOUS at 08:46

## 2024-07-19 RX ADMIN — FENTANYL CITRATE 25 MCG: 50 INJECTION INTRAMUSCULAR; INTRAVENOUS at 09:57

## 2024-07-19 RX ADMIN — LIDOCAINE HYDROCHLORIDE 0.2 ML: 10 INJECTION, SOLUTION EPIDURAL; INFILTRATION; INTRACAUDAL; PERINEURAL at 08:02

## 2024-07-19 RX ADMIN — ACETAMINOPHEN 650 MG: 325 TABLET, FILM COATED ORAL at 11:00

## 2024-07-19 RX ADMIN — MIDAZOLAM 1 MG: 1 INJECTION INTRAMUSCULAR; INTRAVENOUS at 08:37

## 2024-07-19 RX ADMIN — DEXAMETHASONE SODIUM PHOSPHATE 10 MG: 4 INJECTION, SOLUTION INTRA-ARTICULAR; INTRALESIONAL; INTRAMUSCULAR; INTRAVENOUS; SOFT TISSUE at 08:53

## 2024-07-19 ASSESSMENT — ACTIVITIES OF DAILY LIVING (ADL)
ADLS_ACUITY_SCORE: 31
ADLS_ACUITY_SCORE: 22
ADLS_ACUITY_SCORE: 29
ADLS_ACUITY_SCORE: 31
ADLS_ACUITY_SCORE: 25
ADLS_ACUITY_SCORE: 31
ADLS_ACUITY_SCORE: 22
ADLS_ACUITY_SCORE: 31

## 2024-07-19 ASSESSMENT — COPD QUESTIONNAIRES
COPD: 1
CAT_SEVERITY: MODERATE

## 2024-07-19 ASSESSMENT — ENCOUNTER SYMPTOMS: DYSRHYTHMIAS: 1

## 2024-07-19 NOTE — PROVIDER NOTIFICATION
Dr. Dixon notified pt. Rose Krishna. Pt's SpO2 is 88-91% on 6-8L Oxymask and unable to ween to nasal cannula. Are you able to come see pt in PACU after your current case ends? Thanks. Orlando Guadarrama (Wilda) or [phone]

## 2024-07-19 NOTE — DISCHARGE INSTRUCTIONS
Post Bronchoscopy Patient Instructions:    July 19, 2024  Rose Krishna    Your procedure was completed (bronchoscopy with dilation and biopsy) without any immediate complications.    You may cough up scant amount of blood for the next 12-24 hours. If you have excessive cough with blood, chest pain, shortness of breath, please report to the closest emergency room.    You may experience low grade (less than 100.5 F) fever next 24 hours, if so, you can take Tylenol. If the fever persists more than 24 hours, please contact to our office or your primary care provider.    Our office (Thoracic/Pulmonary--[phone]) will call you with the results of any samples taken during the procedure. Please note that you may get a result notification through  My Chart  before us calling you as the Laboratories are instructed to release the results as soon as they are available to the patients and providers at the same time. Please allow your provider 24 hours call you to discuss the results.    You may resume your diet as it was prior to procedure.    You may resume your medications after the procedure unless you are instructed to do differently.     Please follow instructions from the nursing staff upon discharge in terms of activity. In general, you should avoid any attention or motor skill requiring activities (e.g., driving or operating any motorized vehicle) for 24 hours as you might be still under the effect of sedation medications. Please make sure an adult to accompany you next 24 hours.     Should you have any question, please do not hesitate to call our office.    Kemal Dixon MD

## 2024-07-19 NOTE — H&P
[facility]    History and Physical - Medicine Service, [facility] TEAM 1       Date of Admission:  7/19/2024    Assessment & Plan      Rose Krishna is a 64 year old female admitted on 7/19/2024. She has a history of hypothyroidism, ulcerative colitis, atrial fibrillation s/p ablation in 2017 and  ANCA vasculitis with granulomas on biopsy (3/13/24) now s/p Rituximab infusions x 4, s/p salumedrol x 3 and s/p prednisone with Bactrim prophy now on Tavenous 30mg BID, Cyotoxin (next infusion on 7/22) and Mesna during infusions who presented with acute hypoxic respiratory failure after rigid bronchoscopy with balloon dilation and BAL. Differential diagnosis includes pulmonary infection in the setting of Cyotoxin immunosuppression, progression of underlying ANCA small vessel vasculitis, or post-bronchoscopy complication (less likely given benign CXR and symmetric lung sounds). Patient worked up for pulmonary involvement of underlying IBD in the outpatient setting, which was negative.       # Acute Hypoxic Respiratory Failure s/p rigid bronchosopy (7/19/2024)  # Worsening SOB and Dyspnea on exertion  # Nocturnal Hypoxia, baseline 2L NC  # ANCA Vasculitis MPO+ with granulomas  #Immunosuppression on Cytoxan (6/24, 7/8)  Patient with known history of ANCA vasculitis w/ granulomas s/p 4 Rituximab infusions and prednisone. PCP raised concerns for disease progression on 5/23, noting stridor and worsening shortness of breath prompting urgent rheumatology follow-up and initiation of Cyotoxin and prednisone 30mg every day x 2 weeks on 5/29. She also had urgent CT chest on 6/6 ordered by pulmonology which showed mild narrowing of bronchus intermedisu prompting rigid bronchoscopy on 7/19 with BAL, biopsy, and possible balloon dilation. ENT evaluated airway with flex laryngoscopy on 6/10 and did not appreciate stridor or supraglottic stenosis, although this was after initiation of  Cyotoxin.   -Denies neurologic or renal involvement noting she gets UAs weekly in the outpatient setting, which show intermittent proteinuria and has no history of hematuria or rapidly progressive glomerulonephritis. Also had recent MRI/MRA HEAD without evidence of large vessel vasculitis although has patchy chronic small vessel ischemic changes that may reflect sequelae of vasculitis. Denies any focal neurologic symptoms and neurologic exam non-focal.   -Echocardiogram on 7/5/24 EF 65% and mildly dilated aorta. Stress test recommended to rule out coronary artery involvement of vasculitis.   -Endorses new nonblanchable lesions x 2 on the right lower extremity that are non-blanching.   -Recent CTA Chest on 5/24 showing no evidence of PE, biapical and bilateral lower lobe scarring, scattered small pulmonary nodules recommending follow-up CT in 12 mo due to increase risk of malignancy. CXR from 7/19 with RLL opacities consistent with recent BAL and no focal signs on pneumonia on pulmonary exam.     Plan   - on NC, cPulse Ox, wean as tolerated (baseline daytime RA, nighttime 2L NC)  - Interventional Pulmonary Consulted   - s/p Rigid bronchoscopy w/ tissue debulking balloon dilation, bronchus steroid injection and bronchial biopsy   - Bronch Studies: Culture, Fungal Culture, KOH, AFB, Surg Path  - Rheumatology consulted   - Rheum Labs: MPO, ANCA IgG, CRP  - Additional Infxn w/up: COVID, Vial panel, UA  - Consider ID consult pending BAL cultures  - No indication for empiric abx at this time, no indication for steroid use at this time  - trend CBC w/ platelets  - Hold PTA Tavenous 30mg BID (til infxn r/o), Hold plan for Cytoxan Infusion 7/22   - Continue PTA Valtrex 500mg BID  - Pulm Toilet: Albuterol prn, Inspiratory spirometer and encourage OOB    #Hypothyroidism  Extensive family history of hypothyroidism, no known Hashimoto's thyroiditis. Recent history of elevated TSH and increased Synthroid from 100 mcg to 112 mcg  every day  -Continue PTA Synthroid    #GERD  -Continue PTA PPI 40 mg qD    #Ulcerative Colitis, not on systemic therapy  -Diagnosed in 1985 and symptoms consisted of 7 bloody diarrhea daily for several days at time.At the time, limited colonoscopy was performed with pathology concerning for UC. Initiated on sulfasalazine and prednisone; marked symptom improvement and was able to taper off of prednisone and discontinue sulfasalazine. Has had no additional UC treatment since the 1980s. Has approximately 3 stools daily which are loose but formed. Denies hematochezia recently but, on chart review, has intermittent hematochezia with blood mixed into stool. Evaluated for IBD-related chronic bronchitis in January 2024. Does not follow with gastroenterology as an outpatient and is not currently on a pharmacologic regimen to control disease. Denies hematochezia and endorses 2-3 mushy bowel movements/day.     #Atrial fibrillation s/p ablation in 2017  No documented atrial fibrillation since ablation    #Migraines w/o aura (last one in 2017)    #Impingement Syndrome, right shoulder  Last subacromial injection in ortho clinic on 2/26/24, prior subacromial injection in 2018. Does not take PRNs pain medications at home.   -Pain control with Tylenol 650 mg TID PRN           Diet: Combination Diet Low Saturated Fat Na <2400mg Diet, No Caffeine Diet    DVT Prophylaxis: Heparin SQ  Ocampo Catheter: Not present  Fluids: None  Lines: None     Cardiac Monitoring: ACTIVE order. Indication: Procedural area  Code Status: Full Code      Disposition Plan      Expected Discharge Date: 07/21/2024                The patient's care was discussed with the Attending Physician, Dr. Delmy Webster and Patient .      Georgia Stoner MD  Medicine Service, [facility] TEAM 1  [facility]  Securely message with [employer] System (more info)  Text page via [facility] Paging/Directory   See signed in provider for up to date coverage  information  ______________________________________________________________________    Chief Complaint   Shortness of breath and new oxygen requirement after rigid bronchoscopy    History is obtained from the patient    History of Present Illness   Rose Krishna is a 64 year old female who presents with acute hypoxic respiratory failure after bronchoscopy. Of note, the patient endorses a history of ANCA vasculitis, which she follows extensively with her PCP, rheumatologist and pulmonologist in the outpatient setting. In the last 3 months, she has noted that her shortness of breath has significantly worsened, prompting her primary care physician to have her urgently follow up with her rheumatologist and pulmonologist. She was previously managed with Ritximab infusions x 4 in April 2024 and Salumedrol with three of the RTX infusions. Because of her clinical worsening, rheumatology started her on Cyotoxin infusions + Mesna on 6/7. She was also continued on Tavenous and Valtrex at that time. She is now s/p prednisone and s/p decadron.     At baseline, she endroses cough, shortness of breath on exertion when she walks up the stairs or adds any resistance (e.g., stroller with child, walking on an incline), and can only tolerate going up ~5 steps before feeling fully out of breath. She also endorses recently feeling breathy in between words and having to catch her breath mid sentence. She has a pulse oximeter at home and has not endorsed any desats with exertion. Had a sleep study and requires 2L NC while asleep; no O2 requirement during the day. Has no history of rapidly progressive glomerulonephritis and endorses weekly UA to monitor kidney function. She has no focal neurologic symptoms and has not history of neurologic involvement of ANCA vasculitis. She endorses a history of UC but has had no significant flare since she was diagnosed in her mid 20's . She endorses 2-3 loose but formed stools daily. Does not endorse  currently hematochezia. She endorses 2 new skin lesions on her anterior right lower extremity which popped up about 2 weeks ago. They are nonpainful and non pruritic. She denies any new skin nodules. She denies any history of heart disease and has not had any chest pain. She denies any ocular involvement and endorses interval eye exams to evaluate for ANCA vasculitis eye involvement, which to date been negative.     She denies any smoking or tobacco history. Does not drink or use recreational drugs. She lives in [place] and much of here outpatient care is in [place].     Denies any recent surgeries besides recent bronchoscopies x2 and flexible laryngoscopy.     Endorses extensive family history of hypothyroidism.       Past Medical History    Past Medical History:   Diagnosis Date    PONV (postoperative nausea and vomiting)        Past Surgical History   History reviewed. No pertinent surgical history.    Prior to Admission Medications   Prior to Admission Medications   Prescriptions Last Dose Informant Patient Reported? Taking?   TAVNEOS 10 MG CAPS 2024 at 2100  Yes Yes   Sig: Take 30 mg by mouth   aerochamber plus with mask - large/blue/>5 years   Yes No   Sig: See Admin Instructions use with inhaler   budesonide (PULMICORT) 0.5 MG/2ML neb solution Unknown  Yes Yes   Sig: INHALE 1 VIAL VIA NEBULIZER TWICE A DAY RINSE MOUTH AFTER USE   dexAMETHasone (DECADRON) 2 MG tablet Unknown  No Yes   Si tablet (2 mg) daily (with breakfast) for 5 days, THEN 0.5 tablets (1 mg) daily (with breakfast) for 5 days.   estradiol (VIVELLE-DOT) 0.0375 MG/24HR BIW patch   Yes No   Sig: Place 1 patch onto the skin   fludrocortisone (FLORINEF) 0.1 MG tablet Unknown  No Yes   Sig: TAKE 1 TABLET BY MOUTH DAILY FOR 5 DAYS THEN 1 TABEVERY OTHER DAY FOR 10 DAYS   fluticasone (FLONASE) 50 MCG/ACT nasal spray Unknown  Yes Yes   Sig: INHALE 2 SPRAYS IN EACH NOSTRIL 1 TIME PER DAY; AFTER 1 WEEK, MAY ADJUST TO 1 - 2 SPRAYS IN EACH  NOSTRIL 1 TIME PER DAY.   levothyroxine (SYNTHROID/LEVOTHROID) 100 MCG tablet 7/19/2024 at 0200  Yes Yes   Sig: Take 100 mcg by mouth   montelukast (SINGULAIR) 10 MG tablet Unknown  Yes Yes   Sig: Take 1 tablet by mouth at bedtime   omeprazole (PRILOSEC) 20 MG DR capsule 7/19/2024 at 0200  Yes Yes   Sig: Take 20 mg by mouth   predniSONE (DELTASONE) 10 MG tablet Unknown  Yes Yes   Sig: TAKE 4 TABLETS (40 MG) BY MOUTH 1 TIME PER DAY   predniSONE (DELTASONE) 20 MG tablet Unknown  Yes Yes   Sig: Take 60 mg by mouth daily   roflumilast (DALIRESP) 500 MCG TABS tablet Unknown  Yes Yes   Sig: Take 1 tablet by mouth daily at 2 pm   valACYclovir (VALTREX) 500 MG tablet 7/18/2024 at 2100  Yes Yes   Sig: Take 500 mg by mouth as needed      Facility-Administered Medications: None        Allergies   Allergies   Allergen Reactions    Sumatriptan Other (See Comments), Shortness Of Breath, Swelling and Anaphylaxis     Throat        Physical Exam   Vital Signs: Temp: 98.3  F (36.8  C) Temp src: Oral BP: 126/86 Pulse: 97   Resp: 19 SpO2: 93 % O2 Device: Nasal cannula Oxygen Delivery: 3 LPM  Weight: 127 lbs 10.34 oz    General Appearance: Laying in bed, appropriate affect, awake, alert, and interacting with examiner. Becomes tearful when discussing how her shortness of breath has affected her life and limited her activity and is pleasant to interact with throughout the exam.   Eyes: Non erythematous conjunctiva without obvious ulcerations. Sclerae anicteric   HEENT: Moist mucous membranes. No obvious oral thrush. No buccal ulcers or lesions.  Respiratory: Diffuse inspiratory and respiratory wheeze. No crackles or course breath sounds on exam. Equal and symmetric air movement throughout. No stridor over the trachea. Patient on oxymask requiring intermitted pauses in between words to take breaths.   Cardiovascular: Regular rate and rhythm without murmurs or S3/S4.   GI: Bowel sounds present. Nontender, nondistended  Skin: Two  semi-circular nonblanchable discreet lesions on the anterior right lower extremity which are non-tender.   Neurologic:   Hand : 5/5 bilaterally  Biceps: 5/5 bilaterally  Triceps: 5/5 bilaterally  Deltoids: 5/5 bilaterally  Hip flexors: 5/5 bilaterally  Dorsiflexors: 5/5 bilaterally  Plantarflexors: 5/5 bilaterally  CN II-XII grossly intact  Finger to Nose intact without obvious apraxia  Coordination intact without appreciable dysdiadochokinesia   Neuropsychiatric: AOx4    Medical Decision Making       MANAGEMENT DISCUSSED with the following over the past 24 hours: patient and Dr. Webster   NOTE(S)/MEDICAL RECORDS REVIEWED over the past 24 hours: rheumatology, interventional pulmonology, general pulmonology        Data         Imaging results reviewed over the past 24 hrs:   Recent Results (from the past 24 hour(s))   XR Surgery BHARGAV L/T 5 Min Fluoro w Stills    Narrative    This exam was marked as non-reportable because it will not be read by a   radiologist or a [place] non-radiologist provider.         XR Chest Port 1 View    Narrative    EXAM: XR CHEST PORT 1 VIEW  7/19/2024 10:09 AM      HISTORY: post dilation    COMPARISON: Chest CT 6/6/2024    FINDINGS: Single portable AP view of the chest. Slight rightward  deviation of the trachea. Cardiac silhouette is not enlarged. There  are mixed airspace and interstitial opacities in the right lower lung  fields. Left lung is clear. Costophrenic angles are clear. No  pneumothorax.      Impression    IMPRESSION:   1. Right lower lobe opacities, most likely consistent with fluid from  bronchoscopy/BAL    I have personally reviewed the examination and initial interpretation  and I agree with the findings.    REJI TELLEZ MD         SYSTEM ID:  [id number]

## 2024-07-19 NOTE — ANESTHESIA CARE TRANSFER NOTE
Patient: Rose Krishna    Procedure: Procedure(s):  BRONCHOSCOPY, RIGID, tissue debulking balloon dilation,  steroid injection,bronchial biopsy.       Diagnosis: MPA (microscopic polyangiitis) (H) [M31.7]  Diagnosis Additional Information: No value filed.    Anesthesia Type:   General     Note:    Oropharynx: oropharynx clear of all foreign objects and spontaneously breathing  Level of Consciousness: awake  Oxygen Supplementation: face mask  Level of Supplemental Oxygen (L/min / FiO2): 15  Independent Airway: airway patency satisfactory and stable  Dentition: dentition unchanged  Vital Signs Stable: post-procedure vital signs reviewed and stable  Report to RN Given: handoff report given  Patient transferred to: PACU  Comments: Patient coughing severely in PACU. Wide awake. On 15L struggling to get deep breaths. Will give a lidocaine neb for coughing and an albuterol neb for wheezing.  Handoff Report: Identifed the Patient, Identified the Reponsible Provider, Reviewed the pertinent medical history, Discussed the surgical course, Reviewed Intra-OP anesthesia mangement and issues during anesthesia, Set expectations for post-procedure period and Allowed opportunity for questions and acknowledgement of understanding  Vitals:  Vitals Value Taken Time   /131 07/19/24 0945   Temp     Pulse 99 07/19/24 0949   Resp 19 07/19/24 0949   SpO2 86 % 07/19/24 0949   Vitals shown include unfiled device data.    Electronically Signed By: KONSTANTIN Garg CRNA  July 19, 2024  9:50 AM

## 2024-07-19 NOTE — ANESTHESIA PREPROCEDURE EVALUATION
Anesthesia Pre-Procedure Evaluation    Patient: Rose Krishna   MRN: [id number] : 1960        Procedure : Procedure(s):  BRONCHOSCOPY, RIGID, possible tissue debulking, possible CO2 laser, balloon dilation,  possible steroid injection, possible bronchial biopsy.          Past Medical History:   Diagnosis Date    PONV (postoperative nausea and vomiting)       History reviewed. No pertinent surgical history.   Allergies   Allergen Reactions    Sumatriptan Other (See Comments), Shortness Of Breath, Swelling and Anaphylaxis     Throat      Social History     Tobacco Use    Smoking status: Never    Smokeless tobacco: Never   Substance Use Topics    Alcohol use: Not Currently      Wt Readings from Last 1 Encounters:   24 57.9 kg (127 lb 10.3 oz)        Anesthesia Evaluation   Pt has had prior anesthetic. Type: General.    History of anesthetic complications  - PONV.      ROS/MED HX  ENT/Pulmonary: Comment: Granulomatous, Polyangiitis    (+)                         moderate,  COPD, O2 dependent, during Nighttime,         Recent URI: 2.   Neurologic:     (+)      migraines,                          Cardiovascular: Comment: S/P Atrial Fibrillation Ablation    (+)  - -   -  - -                        dysrhythmias, a-fib,             METS/Exercise Tolerance:     Hematologic:  - neg hematologic  ROS     Musculoskeletal:  - neg musculoskeletal ROS     GI/Hepatic: Comment: Hx of Ulcerative Colitis      Renal/Genitourinary:  - neg Renal ROS     Endo: Comment: Autoimmune Arteritis    (+)          thyroid problem, hypothyroidism, Chronic steroid usage for COPD. Date most recently used: 2024.        Psychiatric/Substance Use:  - neg psychiatric ROS     Infectious Disease:  - neg infectious disease ROS     Malignancy:  - neg malignancy ROS     Other:            Physical Exam    Airway        Mallampati: II   TM distance: > 3 FB   Neck ROM: full   Mouth opening: > 3 cm    Respiratory Devices and Support      "    Dental       (+) Multiple crowns, permanant bridges      Cardiovascular   cardiovascular exam normal       Rhythm and rate: regular and normal     Pulmonary           (+) wheezes           OUTSIDE LABS:  CBC:   Lab Results   Component Value Date    WBC 13.4 (H) 06/06/2024    WBC 8.5 03/21/2024    HGB 14.0 06/06/2024    HGB 13.1 03/21/2024    HCT 43.3 06/06/2024    HCT 40.9 03/21/2024     06/06/2024     03/21/2024     BMP:   Lab Results   Component Value Date     06/06/2024     03/21/2024    POTASSIUM 4.0 06/06/2024    POTASSIUM 3.3 (L) 03/21/2024    CHLORIDE 100 06/06/2024    CHLORIDE 102 03/21/2024    CO2 26 06/06/2024    CO2 27 03/21/2024    BUN 19.0 06/06/2024    BUN 20.0 03/21/2024    CR 0.74 06/06/2024    CR 0.78 03/21/2024     (H) 06/06/2024    GLC 86 03/21/2024     COAGS: No results found for: \"PTT\", \"INR\", \"FIBR\"  POC: No results found for: \"BGM\", \"HCG\", \"HCGS\"  HEPATIC:   Lab Results   Component Value Date    ALBUMIN 4.3 06/06/2024    PROTTOTAL 6.6 06/06/2024    ALT 15 06/06/2024    AST 14 06/06/2024    ALKPHOS 52 06/06/2024    BILITOTAL 0.3 06/06/2024     OTHER:   Lab Results   Component Value Date    RANDA 9.1 06/06/2024       Anesthesia Plan    ASA Status:  3    NPO Status:  NPO Appropriate    Anesthesia Type: General.     - Airway: ETT   Induction: Intravenous, Propofol.   Maintenance: TIVA.   Techniques and Equipment:     - Airway: Video-Laryngoscope       Consents    Anesthesia Plan(s) and associated risks, benefits, and realistic alternatives discussed. Questions answered and patient/representative(s) expressed understanding.     - Discussed: Risks, Benefits and Alternatives for the PROCEDURE were discussed     - Discussed with:  Patient      - Extended Intubation/Ventilatory Support Discussed: No.      - Patient is DNR/DNI Status: No     Use of blood products discussed: No .     Postoperative Care    Pain management: IV analgesics.   PONV prophylaxis: Ondansetron " (or other 5HT-3), Dexamethasone or Solumedrol, Aprepitant     Comments:               Nikolai Tipton MD    I have reviewed the pertinent notes and labs in the chart from the past 30 days and (re)examined the patient.  Any updates or changes from those notes are reflected in this note.

## 2024-07-19 NOTE — PROVIDER NOTIFICATION
Pulmonary Fellow Dr. Bautista notified pt. Rose Krishna. Dr. Dixon placed admit order for pt, but pt still needs Transfer Order and Post-Op orders. Are you able to place? Thanks. Orlando Guadarrama ([email or web address]) or [phone]

## 2024-07-19 NOTE — ANESTHESIA POSTPROCEDURE EVALUATION
Patient: Rose Krishna    Procedure: Procedure(s):  BRONCHOSCOPY, RIGID, tissue debulking balloon dilation,  steroid injection,bronchial biopsy.       Anesthesia Type:  General    Note:  Disposition: Inpatient   Postop Pain Control: Uneventful            Sign Out: Well controlled pain   PONV: No   Neuro/Psych: Uneventful            Sign Out: Acceptable/Baseline neuro status   Airway/Respiratory: Uneventful            Sign Out: AIRWAY IN SITU/Resp. Support               Airway in situ/Resp. Support: High FiO2 requirement                 Reason: Planned Pre-op   CV/Hemodynamics: Uneventful            Sign Out: Acceptable CV status; No obvious hypovolemia; No obvious fluid overload   Other NRE: NONE   DID A NON-ROUTINE EVENT OCCUR? No           Last vitals:  Vitals Value Taken Time   /78 07/19/24 1230   Temp 36.6  C (97.9  F) 07/19/24 0945   Pulse 94 07/19/24 1237   Resp 18 07/19/24 1237   SpO2 91 % 07/19/24 1237   Vitals shown include unfiled device data.    Electronically Signed By: Nikolai Tipton MD  July 19, 2024  12:37 PM

## 2024-07-19 NOTE — SUMMARY OF CARE
Pt transferred to .  Patient belongings include the following:  - purse  - clothing (outfit worn to the hospital)  - shoes   - iPhone  - phone

## 2024-07-19 NOTE — BRIEF OP NOTE
[facility]    Brief Operative Note    Pre-operative diagnosis: MPA (microscopic polyangiitis) (H) [M31.7]  Post-operative diagnosis Same as pre-operative diagnosis    Procedure: BRONCHOSCOPY, RIGID, tissue debulking balloon dilation,, N/A - Bronchus  steroid injection,bronchial biopsy., N/A - Update    Surgeon: Surgeons and Role:     * Kemal Dixon MD - Primary  Anesthesia: General   Estimated Blood Loss: None    Drains: None  Specimens:   ID Type Source Tests Collected by Time Destination   1 : Right bronchial biopsy Tissue Lung, Right SURGICAL PATHOLOGY EXAM Kemal Dixon MD 7/19/2024  9:29 AM    A : Right bronchial biopsy Tissue Lung, Right AFB CULTURE AND STAIN NON BLOOD, KOH PREP, FUNGAL OR YEAST CULTURE ROUTINE, AEROBIC BACTERIAL CULTURE ROUTINE Kemal Dixon MD 7/19/2024  9:31 AM      Findings:   Friable and swollen  Complications: None.  Implants: * No implants in log *

## 2024-07-19 NOTE — CONSULTS
RHEUMATOLOGY CONSULT NOTE - FELLOW    Rose Krishna MRN# [id number]   Age: 64 year old YOB: 1960     Date of Admission:  7/19/2024  Reason for consult: Hx of ANCA vasculitis, admitted with AHRF and consulted for consideration of vasculitis as cause of AHRF.    Assessment and Plan:   Ms. Krishna is a 65 y/o woman with PMHx of chronic BREEN c/f ANCA vasculitis, ulcerative colitis, Hld, and paroxysmal Afib who is admitted after bronchoscopy that was complicated post-procedurally with AHRF of unclear etiology. Rheumatology was consulted to evaluate for ANCA vasculitis contribution to AHRF.       ANCA vasculitis, bronchial inflammation, MPO (+) in 1/2024  Acute hypoxic Respiratory Failure  Patient follows with rheumatology in [place].  Patient initially presented with worsening dyspnea in late 2022.  Was found to have be MPO positive.  Initially evaluated by rheumatology in 1/2024 and was placed on steroids with minimal response.  Initial imaging was not convincing for ANCA vasculitis.  Underwent a biopsy in 3/13/2024 which showed granulomatous inflammation and was started on rituximab for which she received two doses in 4/2024.  Patient's symptoms were refractory and imaging on 6/7/2024 showed new cavitary lesions in lungs with supraglottic stenosis. Started on Avacopan. ENT did not find any supraglottal obstruction lesions on laryngoscope.  Patient was referred again to [facility] pulmonology for rigid bronchoscopy with possible balloon dilation.  Patient was started on 1st dose of Cytoxan on 6/24 2024 with some noted periorbital swelling.  Most recently received 2nd dose of Cytoxan on 7/8/2024.  Previously had received 2 doses of rituximab in 4/2024.  She has been on various high doses of steroids since 3/2024.     Patient was admitted for outpatient rigid bronchoscopy on 7/19/2024.  Patient experienced acute hypoxic respiratory failure after bronchoscopy and was admitted.  On exam notable wheezing  throughout bilateral lung fields.  No notable skin exam findings.  No concerns for hemoptysis.  No concerns of chest pain, abdominal pain, or lower extremity edema.  On interview patient is breathing comfortably on 3 L via oxymask.  Given recent Cytoxan infusion 7/8/2024, will need to rule out infection as etiology of AHRF.  Will recommend obtaining MPO, ANCA, CRP, and UA to evaluate for ANCA vasculitis. Recommend involving ID and pulmonology involvement    Recommendations:  -- ANCA, MPO, CRP and UA for now (Ordered for you, signed and held in PACU)   -- Consider Pulmonary and ID involvement to rule out respiratory and infectious etiologies  -- We will continue to follow closely    The patient was seen and staffed with Dr. Brown.     GEETA BRAN MD  IM PGY2  Rheumatology service   [phone]         Chief Complaint:   Acute hypoxic respiratory failure after rigid bronchoscopy          History of Present Illness:   Patient presented for outpatient rigid bronchoscopy with pulmonology today.  Patient experienced acute hypoxic respiratory failure after completion of bronchoscopy.  Patient was seen in the PACU and was experiencing wheezing throughout.  However patient had no difficulty speaking or muffled voice on exam.  Patient appears comfortable while wearing oxymask.  Discussed rheumatologic history and recent medication changes.  Patient confirmed that she received Cytoxan recently on 7/8/2024. She denied any recent fevers, chills, or SOB prior to bronchoscopy. No new rashes noted by patient. No new lower extremity edema.            Past Medical History:     Past Medical History:   Diagnosis Date    PONV (postoperative nausea and vomiting)              Past Surgical History:   History reviewed. No pertinent surgical history.         Social History:     Social History     Socioeconomic History    Marital status:      Spouse name: Not on file    Number of children: Not on file    Years of education: Not on file     Highest education level: Not on file   Occupational History    Not on file   Tobacco Use    Smoking status: Never    Smokeless tobacco: Never   Substance and Sexual Activity    Alcohol use: Not Currently    Drug use: Never    Sexual activity: Not on file   Other Topics Concern    Not on file   Social History Narrative    Not on file     Social Determinants of Health     Financial Resource Strain: Low Risk  (3/26/2024)    Received from [facility]    Overall Financial Resource Strain (CARDIA)     Difficulty of Paying Living Expenses: Not very hard   Food Insecurity: No Food Insecurity (3/26/2024)    Received from [facility]    Hunger Vital Sign     Worried About Running Out of Food in the Last Year: Never true     Ran Out of Food in the Last Year: Never true   Transportation Needs: No Transportation Needs (3/26/2024)    Received from [facility]    PRAPARE - Transportation     Lack of Transportation (Medical): No     Lack of Transportation (Non-Medical): No   Physical Activity: Insufficiently Active (3/26/2024)    Received from [facility]    Exercise Vital Sign     Days of Exercise per Week: 4 days     Minutes of Exercise per Session: 20 min   Stress: No Stress Concern Present (3/26/2024)    Received from [facility]    [place] [place] [employer] - Occupational Stress Questionnaire     Feeling of Stress : Only a little   Social Connections: Socially Integrated (3/26/2024)    Received from [facility]    Social Connection and Isolation Panel [NHANES]     Frequency of Communication with Friends and Family: More than three times a week     Frequency of Social Gatherings with Friends and Family: More than three times a week     Attends [employer] Services: More than 4 times per year     Active Member of Clubs or Organizations: Yes     Attends Club or Organization Meetings: 1 to 4 times per year      Marital Status:    Interpersonal Safety: Not At Risk (2/7/2023)    [facility]    Humiliation, Afraid, Rape, and Kick questionnaire     Fear of Current or Ex-Partner: No     Emotionally Abused: No     Physically Abused: No     Sexually Abused: No   Housing Stability: Low Risk  (3/26/2024)    Received from [facility]    Housing Stability Vital Sign     Unable to Pay for Housing in the Last Year: No     Number of Places Lived in the Last Year: 1     Unstable Housing in the Last Year: No             Family History:   History reviewed. No pertinent family history.          Immunizations:     Most Recent Immunizations   Administered Date(s) Administered    COVID-19 12+ (2023-24) (Pfizer) 11/03/2023    COVID-19 Bivalent 12+ (Pfizer) 09/19/2022    COVID-19 MONOVALENT 12+ (Pfizer) 11/19/2021    COVID-19 Monovalent 12+ (Pfizer 2022) 05/25/2022    Flu, Unspecified 10/30/2020    Influenza (High Dose) 3 valent vaccine 10/03/2016    Influenza Vaccine >6 months,quad, PF 09/15/2023    Pneumococcal 20 valent Conjugate (Prevnar 20) 02/28/2024    RSV Vaccine (Abrysvo) 02/28/2024    TDAP (Adacel,Boostrix) 03/05/2015             Allergies:     Allergies   Allergen Reactions    Sumatriptan Other (See Comments), Shortness Of Breath, Swelling and Anaphylaxis     Throat             Medications:     Medications Prior to Admission   Medication Sig Dispense Refill Last Dose    budesonide (PULMICORT) 0.5 MG/2ML neb solution INHALE 1 VIAL VIA NEBULIZER TWICE A DAY RINSE MOUTH AFTER USE   Unknown    dexAMETHasone (DECADRON) 2 MG tablet 1 tablet (2 mg) daily (with breakfast) for 5 days, THEN 0.5 tablets (1 mg) daily (with breakfast) for 5 days. 10 tablet 0 Unknown    fludrocortisone (FLORINEF) 0.1 MG tablet TAKE 1 TABLET BY MOUTH DAILY FOR 5 DAYS THEN 1 TABEVERY OTHER DAY FOR 10 DAYS 10 tablet 0 Unknown    fluticasone (FLONASE) 50 MCG/ACT nasal spray INHALE  2 SPRAYS IN EACH NOSTRIL 1 TIME PER DAY; AFTER 1 WEEK, MAY ADJUST TO 1 - 2 SPRAYS IN EACH NOSTRIL 1 TIME PER DAY.   Unknown    levothyroxine (SYNTHROID/LEVOTHROID) 100 MCG tablet Take 100 mcg by mouth   7/19/2024 at 0200    montelukast (SINGULAIR) 10 MG tablet Take 1 tablet by mouth at bedtime   Unknown    omeprazole (PRILOSEC) 20 MG DR capsule Take 20 mg by mouth   7/19/2024 at 0200    predniSONE (DELTASONE) 10 MG tablet TAKE 4 TABLETS (40 MG) BY MOUTH 1 TIME PER DAY   Unknown    predniSONE (DELTASONE) 20 MG tablet Take 60 mg by mouth daily   Unknown    roflumilast (DALIRESP) 500 MCG TABS tablet Take 1 tablet by mouth daily at 2 pm   Unknown    TAVNEOS 10 MG CAPS Take 30 mg by mouth   7/18/2024 at 2100    valACYclovir (VALTREX) 500 MG tablet Take 500 mg by mouth as needed   7/18/2024 at 2100    aerochamber plus with mask - large/blue/>5 years See Admin Instructions use with inhaler       estradiol (VIVELLE-DOT) 0.0375 MG/24HR BIW patch Place 1 patch onto the skin          Current Facility-Administered Medications   Medication Dose Route Frequency Provider Last Rate Last Admin    acetaminophen (TYLENOL) tablet 650 mg  650 mg Oral Q4H PRN Nikolai Tipton MD   650 mg at 07/19/24 1100    albuterol (PROVENTIL) neb solution 2.5 mg  2.5 mg Nebulization Q6H PRN Nikolai Tipton MD   2.5 mg at 07/19/24 0950    Avacopan CAPS 30 mg  30 mg Oral BID Delmy Webster MD        dexAMETHasone (DECADRON) injection 4 mg  4 mg Intravenous Once PRN Nikolai Tipton MD        dexAMETHasone (DECADRON) injection 4 mg  4 mg Intravenous Once PRN Nikolai Tipton MD        fentaNYL (PF) (SUBLIMAZE) injection 25 mcg  25 mcg Intravenous Q5 Min PRN Nikolai Tipton MD        fentaNYL (PF) (SUBLIMAZE) injection 50 mcg  50 mcg Intravenous Q5 Min PRN Nikolai Tipton MD        fluticasone (FLONASE) 50 MCG/ACT spray 1-2 spray  1-2 spray Both Nostrils Daily Disomma Georgia, MD        hydrALAZINE (APRESOLINE) injection 2.5-5 mg  2.5-5 mg Intravenous  Q10 Min PRN Nikolai Tipton MD        HYDROmorphone (DILAUDID) injection 0.2 mg  0.2 mg Intravenous Q5 Min PRN Nikolai Tipton MD        HYDROmorphone (DILAUDID) injection 0.4 mg  0.4 mg Intravenous Q5 Min PRN Nikolai Tipton MD        labetalol (NORMODYNE/TRANDATE) injection 10 mg  10 mg Intravenous Once PRN Nikolai Tipton MD        lactated ringers infusion   Intravenous Continuous Nikolai Tipton MD        levothyroxine (SYNTHROID/LEVOTHROID) tablet 112 mcg  112 mcg Oral Daily Delmy Webster MD        lidocaine (LMX4) cream   Topical Q1H PRN Georgia Joseph MD        lidocaine 1 % 0.1-1 mL  0.1-1 mL Other Q1H PRN Georgia Joseph MD        naloxone (NARCAN) injection 0.1 mg  0.1 mg Intravenous Q2 Min PRN Nikolai Tipton MD        naloxone (NARCAN) injection 0.1 mg  0.1 mg Intravenous Q2 Min PRN Nikolai Tipton MD        [START ON 7/20/2024] omeprazole (PriLOSEC) CR capsule 20 mg  20 mg Oral QAM AC Georgia Joseph MD        ondansetron (ZOFRAN ODT) ODT tab 4 mg  4 mg Oral Q30 Min PRN Nikolai Tipton MD        Or    ondansetron (ZOFRAN) injection 4 mg  4 mg Intravenous Q30 Min PRN Nikolai Tipton MD        ondansetron (ZOFRAN ODT) ODT tab 4 mg  4 mg Oral Q30 Min PRN Nikolai Tipton MD        Or    ondansetron (ZOFRAN) injection 4 mg  4 mg Intravenous Q30 Min PRN Nikolai Tipton MD        oxyCODONE (ROXICODONE) tablet 5 mg  5 mg Oral Once PRN Nikolai Tipton MD        oxyCODONE IR (ROXICODONE) tablet 10 mg  10 mg Oral Once PRN Nikolai Tipton MD        polyethylene glycol (MIRALAX) Packet 17 g  17 g Oral BID PRN Georgia Joseph MD        prochlorperazine (COMPAZINE) injection 5 mg  5 mg Intravenous Q6H PRN Nikolai Tipton MD        prochlorperazine (COMPAZINE) injection 5 mg  5 mg Intravenous Q6H PRN Nikolai Tipton MD        senna-docusate (SENOKOT-S/PERICOLACE) 8.6-50 MG per tablet 1 tablet  1 tablet Oral BID PRN Georgia Joseph MD        Or    senna-docusate (SENOKOT-S/PERICOLACE) 8.6-50 MG  "per tablet 2 tablet  2 tablet Oral BID PRN Georgia Joseph MD        sodium chloride (PF) 0.9% PF flush 3 mL  3 mL Intracatheter Q8H Georgia Joseph MD        sodium chloride (PF) 0.9% PF flush 3 mL  3 mL Intracatheter q1 min prn Georgia Joseph MD        valACYclovir (VALTREX) tablet 500 mg  500 mg Oral Q12H [facility] (08/20) Delmy Webster MD                Review of Systems:   Complete 12 point ROS completed and negative unless mentioned in HPI.          Physical Exam:   /86   Pulse 93   Temp 98.3  F (36.8  C) (Oral)   Resp 16   Ht 1.575 m (5' 2\")   Wt 57.9 kg (127 lb 10.3 oz)   SpO2 93%   BMI 23.35 kg/m      Constitutional: well-developed, appearing stated age; cooperative, not in acute distress  Resp: wheezing throughout in bilateral lung fields, breathing comfortably on 3L of oxymask  CV: RRR, no murmurs, rubs or gallops, no edema  GI: soft, non-tender, non-distended  Lymph: no cervical, supraclavicular, or epitrochlear nodes  Neuro: nl cranial nerves, strength, sensation,     MS: The shoulder, elbow, wrist, MCP/PIP/DIP, spine, hip, knee, ankle, and MTP/IP joints were examined and found normal. No active synovitis or altered joint anatomy.   No dactylitis,  tenosynovitis, enthesopathy.          Data:   Labs and imaging reviewed.     GEETA BRAN MD  Rheumatology fellow           "

## 2024-07-19 NOTE — ANESTHESIA PROCEDURE NOTES
Airway       Patient location during procedure: OR  Staff -        CRNA: Lilia Dacosta APRN CRNA       Performed By: CRNAIndications and Patient Condition       Indications for airway management: dalton-procedural       Induction type:intravenous       Mask difficulty assessment: 3 - difficult mask (inadequate, unstable, or two providers) +/- NMBA    Final Airway Details       Final airway type: mask      Post intubation assessment        Ease of procedure: easy       Dentition: Intact and Unchanged    Additional Comments       Mask semi difficult. Required 2 providers. Ridged bronch for airway ventilation

## 2024-07-19 NOTE — LETTER
Transition Communication Hand-off for Care Transitions to Next Level of Care Provider    Name: Rose Krishna  : 1960  MRN #: [id number]  Primary Care Provider: Sunita Doimnguez     Primary Clinic: [address] [address]     Reason for Hospitalization:  MPA (microscopic polyangiitis) (H) [M31.7]  Admit Date/Time: 2024  6:33 AM  Discharge Date: 24    Payor Source: Payor: BCBS / Plan: [place] / Product Type: Indemnity /     Readmission Assessment Measure (ALICIA) Risk Score/category: 8%         Reason for Communication Hand-off Referral: Avoidable readmission within 30 days    Discharge Plan: Patient discharge home today, needs increase O2 supplement.    Concern for non-adherence with plan of care:   Y/N N  Discharge Needs Assessment:  Needs      Flowsheet Row Most Recent Value   Equipment Currently Used at Home none   # of Referrals Placed by CM External Care Coordination            Already enrolled in Tele-monitoring program and name of program:  NA  Follow-up specialty is recommended: Yes    Follow-up plan:    Future Appointments   Date Time Provider Department Center   2024  7:00 PM Alicia Downs, PT [facility]   8/15/2024  3:00 PM Jorge Bailey MD [facility]       Any outstanding tests or procedures:        Referrals       Future Labs/Procedures    Adult Pulmonary Medicine  Referral     Process Instructions:    If you are placing an order for Respiratory Therapy Education specific to COPD or asthma, please use the RT EDUCATION  REFERRAL instead.    Consider Adult E-Consult to Pulmonary for the following conditions: abnormal imaging (CXR/CT), abnormal spirometry, asthma, COPD.  E-Consult Cost: $125.     Comments:    Please be aware that coverage of these services is subject to the terms and limitations of your health insurance plan.  Call member services at your health plan with any benefit or coverage questions.  [facility]  [facility] will call you to coordinate your care as prescribed by the provider.  If you don t hear from a representative within 2 business days, please call [phone].      Adult Rheumatology  Referral     Comments:    Please be aware that coverage of these services is subject to the terms and limitations of your health insurance plan.  Call member services at your health plan with any benefit or coverage questions.  [facility] Rheumatology  will call you to coordinate your care as prescribed by your provider. A representative will call you within 1 business day to help schedule your appointment, or you may contact the  Representative at [phone].              Supplies       Future Labs/Procedures    Oxygen Adult/Peds     Comments:    Oxygen Documentation  I certify that this patient, Rose Krishna has been under my care (or a nurse practitioner or physican's assistant working with me). This is the face-to-face encounter for oxygen medical necessity.      At the time of this encounter, I have reviewed the qualifying testing and have determined that supplemental oxygen is reasonable and necessary and is expected to improve the patient's condition in a home setting.         Patient has continued oxygen desaturation due to Acute Respiratory Failure J96.01.    If portability is ordered, is the patient mobile within the home? yes    Was this visit performed as a telehealth visit: No    Patient has been assessed for Home Oxygen needs. Oxygen readings:     *Pulse oximetry (SpO2) = 90% on room air at rest while awake.     *SpO2 improved to 96% on 2 liters/minute at rest.     *SpO2 = 80% on room air during activity/with exercise.     *SpO2 improved to 93% on 4 liters/minute during activity/with exercise.            Key Recommendations:      Marilynn Walters RN    AVS/Discharge Summary is the source of truth; this is a helpful guide for improved communication of patient  story

## 2024-07-19 NOTE — PROGRESS NOTES
Brief interventional pulmonary note-full note to follow    64-year-old woman with what appears to be airway involvement from vasculitis brought in for procedure today for dilation of her bronchus intermedius.    She has been previously treated with steroids and rituximab without improvement.  Started cyclophosphamide in June.  She has had increasing hemoptysis and fatigue.    On bronchoscopy today, unfortunately she had diffusely erythematous airways, right greater than left.  I did some dilation as well as biopsies and the degree of oozing from much of her mucosa limited further intervention.    Her CT scan shows involvement in multiple small airways as well as the area of greatest affecting her bronchus intermedius.    Endobronchial biopsy sent for surgical pathology and culture.    At this point I assume the cause of her postoperative respiratory failure is a combination of mucosal swelling and poor tolerance of a relatively small amount of bleeding.  She had good gas exchange during the case and I suspect that her hypoxia is caused by shunting and VQ mismatch due to small airway occlusion by mucosal swelling and clots rather than parenchymal disease.  My suspicion is that she will significantly improve over the next 24 hours.    Despite the insult from bronchoscopy it sounds like she has had worsening control of her vasculitis recently.    Given her persistent oxygen requirement of 6 to 8 L we needed to admit her.  I appreciate a close thoughtful opinion from our rheumatology and general internal medicine services.  She has been followed by Dr. Jorge Bailey in our pulmonary division as well as Dr. Inna Viera in [place].  Our pulmonary team will follow over the weekend.    I appreciate her cares.    Kemal Dixon MD

## 2024-07-20 LAB
ALBUMIN UR-MCNC: 20 MG/DL
ANION GAP SERPL CALCULATED.3IONS-SCNC: 10 MMOL/L (ref 7–15)
APPEARANCE UR: CLEAR
BILIRUB UR QL STRIP: NEGATIVE
BUN SERPL-MCNC: 16.9 MG/DL (ref 8–23)
C PNEUM DNA SPEC QL NAA+PROBE: NOT DETECTED
CALCIUM SERPL-MCNC: 8.8 MG/DL (ref 8.8–10.4)
CHLORIDE SERPL-SCNC: 107 MMOL/L (ref 98–107)
COLOR UR AUTO: YELLOW
CREAT SERPL-MCNC: 0.47 MG/DL (ref 0.51–0.95)
CRP SERPL-MCNC: 125 MG/L
EGFRCR SERPLBLD CKD-EPI 2021: >90 ML/MIN/1.73M2
ERYTHROCYTE [DISTWIDTH] IN BLOOD BY AUTOMATED COUNT: 15.6 % (ref 10–15)
FERRITIN SERPL-MCNC: 388 NG/ML (ref 11–328)
FLUAV H1 2009 PAND RNA SPEC QL NAA+PROBE: NOT DETECTED
FLUAV H1 RNA SPEC QL NAA+PROBE: NOT DETECTED
FLUAV H3 RNA SPEC QL NAA+PROBE: NOT DETECTED
FLUAV RNA SPEC QL NAA+PROBE: NOT DETECTED
FLUBV RNA SPEC QL NAA+PROBE: NOT DETECTED
GLUCOSE SERPL-MCNC: 137 MG/DL (ref 70–99)
GLUCOSE UR STRIP-MCNC: NEGATIVE MG/DL
HADV DNA SPEC QL NAA+PROBE: NOT DETECTED
HCO3 SERPL-SCNC: 24 MMOL/L (ref 22–29)
HCOV PNL SPEC NAA+PROBE: NOT DETECTED
HCT VFR BLD AUTO: 32.6 % (ref 35–47)
HGB BLD-MCNC: 10.6 G/DL (ref 11.7–15.7)
HGB UR QL STRIP: NEGATIVE
HMPV RNA SPEC QL NAA+PROBE: NOT DETECTED
HPIV1 RNA SPEC QL NAA+PROBE: NOT DETECTED
HPIV2 RNA SPEC QL NAA+PROBE: NOT DETECTED
HPIV3 RNA SPEC QL NAA+PROBE: NOT DETECTED
HPIV4 RNA SPEC QL NAA+PROBE: NOT DETECTED
IRON BINDING CAPACITY (ROCHE): 193 UG/DL (ref 240–430)
IRON SATN MFR SERPL: 16 % (ref 15–46)
IRON SERPL-MCNC: 30 UG/DL (ref 37–145)
KETONES UR STRIP-MCNC: ABNORMAL MG/DL
LEUKOCYTE ESTERASE UR QL STRIP: NEGATIVE
M PNEUMO DNA SPEC QL NAA+PROBE: NOT DETECTED
MCH RBC QN AUTO: 30.9 PG (ref 26.5–33)
MCHC RBC AUTO-ENTMCNC: 32.5 G/DL (ref 31.5–36.5)
MCV RBC AUTO: 95 FL (ref 78–100)
MUCOUS THREADS #/AREA URNS LPF: PRESENT /LPF
NITRATE UR QL: NEGATIVE
PH UR STRIP: 6.5 [PH] (ref 5–7)
PLATELET # BLD AUTO: 362 10E3/UL (ref 150–450)
POTASSIUM SERPL-SCNC: 3.9 MMOL/L (ref 3.4–5.3)
RBC # BLD AUTO: 3.43 10E6/UL (ref 3.8–5.2)
RBC URINE: <1 /HPF
RSV RNA SPEC QL NAA+PROBE: NOT DETECTED
RSV RNA SPEC QL NAA+PROBE: NOT DETECTED
RV+EV RNA SPEC QL NAA+PROBE: DETECTED
SARS-COV-2 RNA RESP QL NAA+PROBE: NEGATIVE
SODIUM SERPL-SCNC: 141 MMOL/L (ref 135–145)
SP GR UR STRIP: 1.03 (ref 1–1.03)
SQUAMOUS EPITHELIAL: <1 /HPF
UROBILINOGEN UR STRIP-MCNC: NORMAL MG/DL
VIT B12 SERPL-MCNC: 663 PG/ML (ref 232–1245)
WBC # BLD AUTO: 10.7 10E3/UL (ref 4–11)
WBC URINE: <1 /HPF

## 2024-07-20 PROCEDURE — 36415 COLL VENOUS BLD VENIPUNCTURE: CPT

## 2024-07-20 PROCEDURE — 999N000147 HC STATISTIC PT IP EVAL DEFER

## 2024-07-20 PROCEDURE — 250N000013 HC RX MED GY IP 250 OP 250 PS 637

## 2024-07-20 PROCEDURE — 80048 BASIC METABOLIC PNL TOTAL CA: CPT

## 2024-07-20 PROCEDURE — 85027 COMPLETE CBC AUTOMATED: CPT

## 2024-07-20 PROCEDURE — 82728 ASSAY OF FERRITIN: CPT | Performed by: INTERNAL MEDICINE

## 2024-07-20 PROCEDURE — 250N000013 HC RX MED GY IP 250 OP 250 PS 637: Performed by: INTERNAL MEDICINE

## 2024-07-20 PROCEDURE — 99233 SBSQ HOSP IP/OBS HIGH 50: CPT | Performed by: INTERNAL MEDICINE

## 2024-07-20 PROCEDURE — 999N000111 HC STATISTIC OT IP EVAL DEFER

## 2024-07-20 PROCEDURE — 81001 URINALYSIS AUTO W/SCOPE: CPT

## 2024-07-20 PROCEDURE — 99233 SBSQ HOSP IP/OBS HIGH 50: CPT | Mod: GC | Performed by: INTERNAL MEDICINE

## 2024-07-20 PROCEDURE — 120N000002 HC R&B MED SURG/OB UMMC

## 2024-07-20 PROCEDURE — 86140 C-REACTIVE PROTEIN: CPT

## 2024-07-20 PROCEDURE — 87635 SARS-COV-2 COVID-19 AMP PRB: CPT

## 2024-07-20 PROCEDURE — 83876 ASSAY MYELOPEROXIDASE: CPT

## 2024-07-20 PROCEDURE — 82607 VITAMIN B-12: CPT | Performed by: INTERNAL MEDICINE

## 2024-07-20 PROCEDURE — 83550 IRON BINDING TEST: CPT | Performed by: INTERNAL MEDICINE

## 2024-07-20 PROCEDURE — 86036 ANCA SCREEN EACH ANTIBODY: CPT

## 2024-07-20 PROCEDURE — 99255 IP/OBS CONSLTJ NEW/EST HI 80: CPT | Performed by: STUDENT IN AN ORGANIZED HEALTH CARE EDUCATION/TRAINING PROGRAM

## 2024-07-20 PROCEDURE — 87581 M.PNEUMON DNA AMP PROBE: CPT

## 2024-07-20 RX ORDER — ACETAMINOPHEN 325 MG/1
650 TABLET ORAL EVERY 6 HOURS PRN
Status: DISCONTINUED | OUTPATIENT
Start: 2024-07-20 | End: 2024-07-23 | Stop reason: HOSPADM

## 2024-07-20 RX ORDER — ALBUTEROL SULFATE 0.83 MG/ML
2.5 SOLUTION RESPIRATORY (INHALATION) EVERY 6 HOURS PRN
Status: DISCONTINUED | OUTPATIENT
Start: 2024-07-20 | End: 2024-07-23 | Stop reason: HOSPADM

## 2024-07-20 RX ORDER — SULFAMETHOXAZOLE/TRIMETHOPRIM 800-160 MG
1 TABLET ORAL
Status: DISCONTINUED | OUTPATIENT
Start: 2024-07-22 | End: 2024-07-23 | Stop reason: HOSPADM

## 2024-07-20 RX ADMIN — PANTOPRAZOLE SODIUM 40 MG: 40 TABLET, DELAYED RELEASE ORAL at 08:45

## 2024-07-20 RX ADMIN — LEVOTHYROXINE SODIUM 112 MCG: 0.11 TABLET ORAL at 08:45

## 2024-07-20 RX ADMIN — VALACYCLOVIR HYDROCHLORIDE 500 MG: 500 TABLET, FILM COATED ORAL at 20:38

## 2024-07-20 RX ADMIN — ACETAMINOPHEN 650 MG: 325 TABLET, FILM COATED ORAL at 21:33

## 2024-07-20 RX ADMIN — ACETAMINOPHEN 650 MG: 325 TABLET, FILM COATED ORAL at 15:39

## 2024-07-20 RX ADMIN — ACETAMINOPHEN 650 MG: 325 TABLET, FILM COATED ORAL at 09:03

## 2024-07-20 RX ADMIN — VALACYCLOVIR HYDROCHLORIDE 500 MG: 500 TABLET, FILM COATED ORAL at 08:45

## 2024-07-20 ASSESSMENT — ACTIVITIES OF DAILY LIVING (ADL)
ADLS_ACUITY_SCORE: 25

## 2024-07-20 NOTE — PLAN OF CARE
"1900-0730  Vitals:  Blood pressure 112/83, pulse 78, temperature 97.6  F (36.4  C), temperature source Oral, resp. rate 18, height 1.575 m (5' 2\"), weight 57.9 kg (127 lb 10.3 oz), SpO2 95%.  Neuro: A/Ox4, calls appropriately   Cardiac: VSS   Respiratory: sating >92 on 2L NC, no sob   Diet/appetite: comb diet Na restriction <2400 mg, well tolerated  GI/:  voids ad marco, no bm this shift  Activity:  SBA  Pain: 6/10 headache, prn tylenol 650 mg given  Skin: Skin check complete Margaux Guerrero RN, generalized bruising, no adjustments needed  Lines: L PIV sl    Plan: cont to monitor pt condition, notify team of changes per POC. Urine sample needed, Covid swab pending.    Problem: Adult Inpatient Plan of Care  Goal: Plan of Care Review  Description: The Plan of Care Review/Shift note should be completed every shift.  The Outcome Evaluation is a brief statement about your assessment that the patient is improving, declining, or no change.  This information will be displayed automatically on your shift  note.  Outcome: Progressing  Flowsheets (Taken 7/19/2024 2222)  Plan of Care Reviewed With:   patient   friend  Overall Patient Progress: no change     Problem: Adult Inpatient Plan of Care  Goal: Readiness for Transition of Care  Outcome: Progressing  Intervention: Mutually Develop Transition Plan  Recent Flowsheet Documentation  Taken 7/19/2024 2000 by Fe Melo, RN  Equipment Currently Used at Home: none     Problem: Pain Acute  Goal: Optimal Pain Control and Function  Outcome: Progressing     Problem: Fall Injury Risk  Goal: Absence of Fall and Fall-Related Injury  Outcome: Progressing     "

## 2024-07-20 NOTE — PROGRESS NOTES
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 Unfortunately, she has previously been on steroids/rituximab without response (x4 doses), and 2 doses cyclophosphamide started in June, and Avacopan.     Patient presented for outpatient bronchoscopy, BAL. Bronch revealed diffusely erythematous airways and she developed oozing bleed from mucosa with biopsies and limited dilation. , dilation of BI and subsequently developed AHRF requiring 8-10L and inpatient stay.     Patient FTH rhinovirus but likely her AHRF is 2/2 BAL fluid and sedation + viral illness in setting of underlying uncontrolled vasculitis. Defer to rheumatology regarding further immune suppression and ID regarding antimicrobials (low suspicion for acute infection, though appears she hasn't been on PJP ppx).     Recommend ongoing airway clearance, mobilization, wean O2 as able, and monitor clinically.      Recommendations:   -appreciate rheum and ID recs, defer immune therapy and antimicrobials to them  -mobilization and O2 wean as able  -patient should likely be on PJP ppx if she remains on steroids + other immune therapy, even if the steroids are <20mg daily    Patient seen and discussed with Dr. López, pulm will continue to follow. Please reach out with questions.    Tiarra Maddox MD, MD on 7/20/2024 at 2:43 PM  PACCM Fellow             Subjective     Ongoing chronic cough overnight, no change in sputum, no frankly bloody secretions, poor sleep and SOB with activity, now on 1-2 LPM satting >92%    No new joint pain, no rashes, no vision changes,  does have headache     Personal hx: IBS  Family hx: no known GPA  Surgical: none in chart, multiple bronchs             Review of Systems:   As per HPI, otherwise neg          Physical Exam:   Vitals were reviewed  Temp: 97.6  F (36.4  C) Temp src: Oral BP: 112/83 Pulse: 78   Resp: 18 SpO2: 91 % O2 Device: None (Room air) Oxygen Delivery: 1 LPM  Gen: tired appearing, NAD, nontoxic  HEENT: MMM, no icterus  Pulm: b/l wheezing, no increased WOB, dry cough  Cardio: RRR, no murmurs  Neuro: CN 2-12, moving all extremities  Psych: appropriate mood and affect          Data:     Last Comprehensive Metabolic Panel:  Lab Results   Component Value Date     07/20/2024    POTASSIUM 3.9 07/20/2024    CHLORIDE 107 07/20/2024    CO2 24 07/20/2024    ANIONGAP 10 07/20/2024     (H) 07/20/2024    BUN 16.9 07/20/2024    CR 0.47 (L) 07/20/2024    GFRESTIMATED >90 07/20/2024    RANDA 8.8 07/20/2024       CBC RESULTS:   Recent Labs   Lab Test 07/20/24  0555   WBC 10.7   RBC 3.43*   HGB 10.6*   HCT 32.6*   MCV 95   MCH 30.9   MCHC 32.5   RDW 15.6*          RVP rhinovirus positive  Anca pending    FVC 1.72 (66%)  FEV1 .97 (46%)  DLCO 18 (99%)      Tiarra Maddox MD, MD          Breath Sounds equal & clear to percussion & auscultation, no accessory muscle use

## 2024-07-20 NOTE — PROCEDURES
INTERVENTIONAL PULMONOLOGY       Procedure(s):    A flexible and rigid bronchoscopy   Airway exam  Balloon bronchoplasty without stent placement ( sites)   Bronchography (2 sites)   Endobronchial biopsies (1 sites)    Indication:  vasculitis    Attending of Record:  Kemal Dixon MD    Interventional Pulmonary Fellow   None    Trainees Present:   None     Medications:    General Anesthesia - See anesthesia flowsheet for details    Sedation Time:   Per Anesthesia Care Provider    Time Out:  Performed    The patient's medical record has been reviewed.  The indication for the procedure was reviewed.  The necessary history and physical examination was performed and reviewed.  The risks, benefits and alternatives of the procedure were discussed with the the patient in detail and she had the opportunity to ask questions.  I discussed in particular the potential complications including risks of minor or life-threatening bleeding and/or infection, respiratory failure, vocal cord trauma / paralysis, pneumothorax, and discomfort. Sedation risks were also discussed including abnormal heart rhythms, low blood pressure, and respiratory failure. All questions were answered to the best of my ability.  Verbal and written informed consent was obtained.  The proposed procedure and the patient's identification were verified prior to the procedure by the physician and the surgical team.    The patient was assessed for the adequacy for the procedure and to receive medications.   Mental Status:  Alert and oriented x 3  Airway examination:  Class II (Complete visualization of uvula)  Pulmonary:  Decreased breathsound throughout  CV:  RRR, no murmurs or gallops  ASA Grade:  (III)  Severe systemic disease    After clinical evaluation and reviewing the indication, risks, alternatives and benefits of the procedure the patient was deemed to be in satisfactory condition to undergo the procedure.           A Tuberculosis risk assessment  was performed:  The patient has no known RISK of Tuberculosis    The procedure was performed in a negative airflow room: The patient could not be moved to a negative airflow room because of needed OR for the procedure    Maneuvers / Procedure:      A Flexible and 12mm Rigid bronchoscope bronchoscope was used for the procedure. The rigid bronchoscope was inserted into the mouth. Uvula, epiglottis and vocal cords were seen. The scope was advanced turning the bevel to 90 degress while passing through the cords and into the trachea.     Airway Examination: A complete airway examination was performed from the distal trachea to the subsegmental level in each lobe of both lungs.  Pertinent findings include diffusely inflamed airways R > L.  L main appeared mostly normal with evident mucosal swelling at LC2.  R main inflamed and RUL patent but with abnormal appearing mucosa.  Consistent with CT the BI was circumferentially stenosed but without any discrete scar tissue, just diffused friable mucosa. No targets for debulking or incision.      Bronchus intermedius was dilated to 12mm. RML and RLL were difficult to identify. Using bronchography with 50/50 isovue 200 and saline I was able to identify and dilated to 8mm each but diffuse bleeding prevented further assessment.    Endobronchial biopsies done around abnormal mucosa and sent for pathology and culture.      Due to concern for diffuse injury I did not do BAL out of concern for worsening hypoxia.       Relevant Pictures  Bronchus intermedius    RUL      Recommendations:     -->  Follow up biopsy results. I will discuss with her pulmonology team.    Kemal Dixon MD

## 2024-07-20 NOTE — PLAN OF CARE
Occupational Therapy: Orders received. Chart reviewed and discussed with care team.? Occupational Therapy not indicated due to pt maintaining IND w/ mobility and ADLs. Edu pt on benefits of getting up to chair and amb throughout the day w/ pt in agreement to plan? Defer discharge recommendations to medical team.? Will complete orders.

## 2024-07-20 NOTE — CONSULTS
Please see Dr. Dixon's note as IP initial IP consult note    Tiarra Maddox MD, MD on 7/20/2024 at 10:15 AM

## 2024-07-20 NOTE — CONSULTS
[facility] ID Service: Initial Consultation     Patient:  Rose Krishna, Date of birth 1960, Medical record number [id number]  Date of Visit:  July 20, 2024  Consult Requested by: Delmy Webster MD         Assessment and Recommendations:   ID Problem List:  Acute hypoxic respiratory failure - improved  Now on 2LPM  ANCA vasculitis  Immunosuppressive therapy requiring PJP prophylaxis  Ulcerative colitis    Recommendations:  Suspect respiratory change could be post-procedural - CXR suggests possible fluid associated with bronchoscopy? Would provide supportive care and monitor for now.  Would resume TMP-SMX for PJP prophylaxis  Would hold off on further antibiotics at this time - low suspicion for bacterial pulmonary infection  Suspect known vasculitis process is the primary  of changes seen on bronchoscopy  No directed therapy for rhinovirus, just supportive care  Will follow-up cultures and path from bronchoscopy - appreciate these being collected    Discussion:  63yo F with h/o hypothyroidism, UC, afib s/p ablation (2017), and ANCA vasculitis admitted for planned rigid bronchoscopy and balloon dilation related to ANCA vasculitis and subsequent stenosis. ID consulted due to concern for pulmonary infection.    Low suspicion for bacterial pulmonary infection. More likely, swelling/fluid from recent procedure could explain transient increased O2 needs. Rhinovirus on respiratory panel could also be contributing. Certainly, given her immunesuppresison, she would benefit from continuing her TMP-SMX. Would also continue valacyclovir prophylaxis. Would not start other antimicrobials at this time, will follow-up path/cultures from bronch.    Duane Bill MD  Division of Infectious Diseases and International Medicine  P: [phone]       History of Present Illness:     63yo F with h/o hypothyroidism, UC, afib s/p ablation (2017), and ANCA vasculitis admitted for planned rigid bronchoscopy and balloon  dilation related to ANCA vasculitis and subsequent stenosis. ID consulted due to concern for pulmonary infection.    On bronchoscopy (7/19), pulmonologist noted diffusely inflamed airways (R>L). No discrete scar tissue noted in the bronchus intermdeius, though much of the inflamed mucosa was friable. Dilation was performed and endobronchial lesions were performed and sent for path and culture. Pt has remained afebrile throughout admission with normal WBC (10.7), though with elevated CRP to 125. No abx have been given, though valacyclovir was continued.     With regard to her ANCA vasculitis, it has mostly presented itself as bronchial inflammation. She received rituxumab x 4 infusions (last in April 2024), followed by Tanveous June 2024, and then received cytoxan 6/24/24 with subsequent periorbital edema. The plan was to continue cyclophosphamide and then maintenance rituximab or imhuran. She also received premedication with benadryl and 40mg methylprednisolone before last cyclophosphamide on 7/8/24. She was started on valacyclovir prophylaxis by rheumatology due to immunosuppression and history of HSV. She was started on TMP-SMX in March 2024 when prednisone 60mg was started due to granulomatous inflammation seen on bronchoscopy-collected biopsy - concerning for vasculitis. Plan at that time was to taper prednisone over 3 months once rituximab therapy was started (first dose on 4/5/24). She received 4 doses, with plans (per [place] [facility]) to repeat this in October. After pt reported worsening (headache, achiness, dyspnea, stridor) in May 2024, [place] [facility]ology added Tanveous (5/29/24), increased prednisone back to 60mg daily x 2 weeks. TMP-SMX was briefly (10 days) increased to treatment dose on 6/6/24 before backing down to prophylaxis dose due to worsened bronchial symptoms. 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 [facility] performed flexible laryngoscopy on 6/10 and did not see supraglottic stenosis.          Review of Systems:   Full 10 point ROS obtained, pertinent positives and negatives as above.       Past Medical History:     Past Medical History:   Diagnosis Date    PONV (postoperative nausea and vomiting)      History reviewed. No pertinent surgical history.      Allergies:      Allergies   Allergen Reactions    Sumatriptan Other (See Comments), Shortness Of Breath, Swelling and Anaphylaxis     Throat          Current Antimicrobials:     Valacyclovir       Family History:   History reviewed. No pertinent family history.       Social History:     Social History     Socioeconomic History    Marital status:      Spouse name: Not on file    Number of children: Not on file    Years of education: Not on file    Highest education level: Not on file   Occupational History    Not on file   Tobacco Use    Smoking status: Never    Smokeless tobacco: Never   Substance and Sexual Activity    Alcohol use: Not Currently    Drug use: Never    Sexual activity: Not on file   Other Topics Concern    Not on file   Social History Narrative    Not on file     Social Determinants of Health     Financial Resource Strain: Low Risk  (3/26/2024)    Received from [facility]    Overall Financial Resource Strain (CARDIA)     Difficulty of Paying Living Expenses: Not very hard   Food Insecurity: No Food Insecurity (3/26/2024)    Received from [facility]    Hunger Vital Sign     Worried About Running Out of Food in the Last Year: Never true     Ran Out of Food in the Last Year: Never true   Transportation Needs: No Transportation Needs (3/26/2024)    Received from [facility]    PRAPARE - Transportation     Lack of Transportation (Medical): No     Lack of Transportation (Non-Medical): No    Physical Activity: Insufficiently Active (3/26/2024)    Received from [facility]    Exercise Vital Sign     Days of Exercise per Week: 4 days     Minutes of Exercise per Session: 20 min   Stress: No Stress Concern Present (3/26/2024)    Received from [facility]    [place] [place] [employer] - Occupational Stress Questionnaire     Feeling of Stress : Only a little   Social Connections: Socially Integrated (3/26/2024)    Received from [facility]    Social Connection and Isolation Panel [NHANES]     Frequency of Communication with Friends and Family: More than three times a week     Frequency of Social Gatherings with Friends and Family: More than three times a week     Attends [employer] Services: More than 4 times per year     Active Member of Clubs or Organizations: Yes     Attends Club or Organization Meetings: 1 to 4 times per year     Marital Status:    Interpersonal Safety: Not At Risk (2/7/2023)    Received from [facility], [facility]    Humiliation, Afraid, Rape, and Kick questionnaire     Fear of Current or Ex-Partner: No     Emotionally Abused: No     Physically Abused: No     Sexually Abused: No   Housing Stability: Low Risk  (3/26/2024)    Received from [facility]    Housing Stability Vital Sign     Unable to Pay for Housing in the Last Year: No     Number of Places Lived in the Last Year: 1     Unstable Housing in the Last Year: No          Physical Exam:   Ranges forvital signs:  Temp:  [97.6  F (36.4  C)-98.3  F (36.8  C)] 97.6  F (36.4  C)  Pulse:  [] 78  Resp:  [12-25] 18  BP: ()/(71-91) 112/83  SpO2:  [89 %-100 %] 91 %    Intake/Output Summary (Last 24 hours) at 7/20/2024 1052  Last data filed at 7/20/2024 0319  Gross per 24 hour   Intake 640 ml   Output --   Net 640 ml     Exam:  GENERAL:  well-developed, well-nourished, sitting in bed in no acute  distress. On 2lpm by NC  ENT:  Head is normocephalic, atraumatic. Oropharynx is moist without exudates or ulcers.  EYES:  Eyes have anicteric sclerae.    NECK:  Supple.  LUNGS:  Wheezing in b/l upper lung fields, soft breath sounds in lower and midlung fields - no crackles.  CARDIOVASCULAR:  Regular rate and rhythm with no murmurs, gallops or rubs.  EXT: Extremities warm and without edema.  SKIN:  No acute rashes.    NEUROLOGIC:  Grossly nonfocal.         Laboratory Data:     Hematology Studies    Recent Labs   Lab Test 07/20/24  0555 06/06/24  1248 03/21/24  0848   WBC 10.7 13.4* 8.5   ANEU  --  11.3*  --    AEOS  --  0.0  --    HGB 10.6* 14.0 13.1   MCV 95 91 90    338 270     Metabolic Studies     Recent Labs   Lab Test 07/20/24  0555 06/06/24  1248 03/21/24  0848    139 142   POTASSIUM 3.9 4.0 3.3*   CHLORIDE 107 100 102   CO2 24 26 27   BUN 16.9 19.0 20.0   CR 0.47* 0.74 0.78   GFRESTIMATED >90 90 84     Hepatic Studies    Recent Labs   Lab Test 06/06/24  1248 03/21/24  0848   BILITOTAL 0.3 0.5   ALKPHOS 52 60   ALBUMIN 4.3 4.3   AST 14 11   ALT 15 8     Microbiology:  Culture   Date Value Ref Range Status   07/19/2024 Culture in progress  Preliminary     Urine Studies    Recent Labs   Lab Test 03/21/24  0857   LEUKEST Negative   WBCU 2

## 2024-07-20 NOTE — PROGRESS NOTES
[facility]    Medicine Progress Note - Medicine Service, THOMAS TEAM 1    Date of Admission:  7/19/2024    Assessment & Plan   Rose Krishna is a 64 year old female w/ PMHx of ANCA vasculitis with granulomas on biopsy (3/13/24) currently receiving Cytoxan infusions and Tavenous PO, hypothyroidism, ulcerative colitis, atrial fibrillation s/p ablation in 2017  who has been having issues w/ worsening shortness of breath, BREEN, wheezing, stridor who underwent ridid bronchoscopy w/ Dr. Dixon on 7/19 and post-procedure developed acute hypoxic respiratory failure for which patient was admitted to [facility] team for further management. IP Pulmonary, Rheumatology, and ID consulted for further workup. Patient continues to require oxygen support but has been able to wean. Viral Respiratory swab positive for Rhinovirus.        # Acute Hypoxic Respiratory Failure s/p rigid bronchosopy (7/19/2024)  # Worsening SOB and Dyspnea on exertion  # Nocturnal Hypoxia, baseline 2L NC  # ANCA vasculitis, bronchial inflammation, MPO (+) in 1/2024   #Immunosuppression on Cytoxan (6/24, 7/8)  # Elevated CRP  # +RhinoVirus Viral Swab (7/19)  Patient follows with rheumatology in [place].  Patient initially presented with worsening dyspnea in late 2022.  Was found to have be MPO positive.  Initially evaluated by rheumatology in 1/2024 and was placed on high dose steroids with minimal response. Biopsy in 3/13/2024 which showed granulomatous inflammation and was started on rituximab for which she received 4 doses w/ minimal response. CT imaging on 6/7/24 showed  new cavitary lesions in lungs with supraglottic stenosis. Started on Avacopan. ENT did not find any supraglottal obstruction lesions on laryngoscope.  Patient was referred again to [facility] pulmonology for rigid bronchoscopy with possible balloon dilation. Started on 1st dose of Cytoxan on 6/24 2024 and 2nd dose on 7/8/2024. Denies neurologic or  renal involvement noting she gets UAs weekly in the outpatient setting, which show intermittent proteinuria and has no history of hematuria or rapidly progressive glomerulonephritis. Also had recent MRI/MRA HEAD without evidence of large vessel vasculitis although has patchy chronic small vessel ischemic changes that may reflect sequelae of vasculitis. Denies any focal neurologic symptoms and neurologic exam non-focal. Echocardiogram on 7/5/24 EF 65% and mildly dilated aorta. Stress test recommended to rule out coronary artery involvement of vasculitis.  Post-bronchoscopy on 7/19 developed acute hypoxic respiratory failure for which patient was admitted to [facility] team for further management. CXR from 7/19 with RLL opacities consistent with recent BAL and no focal signs on pneumonia on pulmonary exam. COVID negative. Viral swab + Rhino/Enterovirus.  on admission.   - on NC, cPulse Ox, wean as tolerated  to baseline (baseline daytime RA, nighttime 2L NC)  - Interventional Pulmonary Consulted              - s/p Rigid bronchoscopy w/ tissue debulking balloon dilation, bronchus steroid injection and bronchial biopsy              - Bronch Studies: Culture, Fungal Culture, KOH, AFB, Surg Path pending  - Rheumatology consulted              - Rheum Labs: MPO, ANCA IgG, CRP  - Additional Infxn w/up: COVID, UA  - ID consulted for concern for infxn given immunosuppressant meds  - No indication for empiric abx at this time, no indication for steroid use at this time  - trend CBC w/ platelets  - Hold PTA Tavenous 30mg BID, Hold plan for Cytoxan Infusion 7/22    - discussing w/ above consult teams when to restart these medications   - Continue PTA Valtrex 500mg BID  - Pulm Toilet: Albuterol prn, Inspiratory spirometer and encourage OOB     #Hypothyroidism  Extensive family history of hypothyroidism, no known Hashimoto's thyroiditis. Recent history of elevated TSH and increased Synthroid from 100 mcg to 112 mcg every  day  -Continue PTA Synthroid     #GERD  -Continue PTA PPI 40 mg every day    # Normocytic Anemia  Denies clinical signs of blood loss and low concern for hemolysis. Suspect 2/2 Anemia of chronic disease  - trend CBC   - eval Iron studies, B12/folate     #Ulcerative Colitis, not on systemic therapy  -Diagnosed in 1985 and symptoms consisted of 7 bloody diarrhea daily for several days at time.At the time, limited colonoscopy was performed with pathology concerning for UC. Initiated on sulfasalazine and prednisone; marked symptom improvement and was able to taper off of prednisone and discontinue sulfasalazine. Has had no additional UC treatment since the 1980s. Has approximately 3 stools daily which are loose but formed. Denies hematochezia recently but, on chart review, has intermittent hematochezia with blood mixed into stool. Evaluated for IBD-related chronic bronchitis in January 2024. Does not follow with gastroenterology as an outpatient and is not currently on a pharmacologic regimen to control disease. Denies hematochezia and endorses 2-3 mushy bowel movements/day.      #Atrial fibrillation s/p ablation in 2017  No documented atrial fibrillation since ablation     #Migraines w/o aura (last one in 2017)     #Impingement Syndrome, right shoulder  Last subacromial injection in ortho clinic on 2/26/24, prior subacromial injection in 2018. Does not take PRNs pain medications at home.   -Pain control with Tylenol 650 mg TID PRN           Diet: Combination Diet Low Saturated Fat Na <2400mg Diet, No Caffeine Diet    DVT Prophylaxis: Heparin SQ  Ocampo Catheter: Not present  Lines: None     Cardiac Monitoring: None  Code Status: Full Code        Disposition Plan     Medically Ready for Discharge: Anticipated in 2-4 Days      Delmy Webster MD  Medicine Service, [facility] TEAM [address] [place] [address]  Securely message with Ratio (more info)  Text page via Simplebooklet Paging/Directory   See signed  in provider for up to date coverage information  ______________________________________________________________________    Interval History   No acute events overnight, afebrile. Patient able to wean to home nocturnal 2L NC last night. This morning tried to wean to RA but desated to mid 80s, place on 1L. Patient reports able to recover well. Then weaned to 0.5L NC. Not coughing more but ongoing cough which is productive (green but stable/increase). Feels more wheezy today. Coughing fits triggered by deep breathing. Improving bruising on L.LE. PO. Eating well. Denies concerns regarding voiding/stooling.     Physical Exam   Vital Signs: Temp: 97.6  F (36.4  C) Temp src: Oral BP: 112/83 Pulse: 78   Resp: 18 SpO2: 95 % O2 Device: Nasal cannula Oxygen Delivery: 2 LPM  Weight: 127 lbs 10.34 oz    General: alert & oriented, no acute distress as sitting up in bed, MMM, EOMI w/o scleral icterus, neck FROM, fluent speech, very pleasant and conversational  Resp: No increased WOB at rest, no upper airway stridor, diffuse loud inspiratory and expiratory wheezing, on NC  Cards: RRR w/o murmurs/rubs/gallops, no LE edema  GI: soft, nontender, nondistended, +BS  Skin: warm and well perfused, no appreciated rashes, 2 small bruise-like area on R.LE  Neuro/MSK: CN2-12i, moving all 4 extremities equally      Medical Decision Making       50 MINUTES SPENT BY ME on the date of service doing chart review, history, exam, documentation & further activities per the note.  MANAGEMENT DISCUSSED with the following over the past 24 hours: Pulmonary and Rheumatology, ID       Data   ------------------------- PAST 24 HR DATA REVIEWED -----------------------------------------------    I have personally reviewed the following data over the past 24 hrs:    10.7  \   10.6 (L)   / 362     141 107 16.9 /  137 (H)   3.9 24 0.47 (L) \       Imaging results reviewed over the past 24 hrs:   Recent Results (from the past 24 hour(s))   XR Surgery BHARGVA L/T 5  Min Fluoro w Stills    Narrative    This exam was marked as non-reportable because it will not be read by a   radiologist or a [place] non-radiologist provider.         XR Chest Port 1 View    Narrative    EXAM: XR CHEST PORT 1 VIEW  7/19/2024 10:09 AM      HISTORY: post dilation    COMPARISON: Chest CT 6/6/2024    FINDINGS: Single portable AP view of the chest. Slight rightward  deviation of the trachea. Cardiac silhouette is not enlarged. There  are mixed airspace and interstitial opacities in the right lower lung  fields. Left lung is clear. Costophrenic angles are clear. No  pneumothorax.      Impression    IMPRESSION:   1. Right lower lobe opacities, most likely consistent with fluid from  bronchoscopy/BAL    I have personally reviewed the examination and initial interpretation  and I agree with the findings.    REJI TELLEZ MD         SYSTEM ID:  [id number]

## 2024-07-20 NOTE — PHARMACY-ADMISSION MEDICATION HISTORY
"Pharmacy Intern Admission Medication History    Admission medication history is complete. The information provided in this note is only as accurate as the sources available at the time of the update.    Information Source(s): Patient and CareEverywhere/SureScripts via phone    Pertinent Information:  Patient was a good historian  Estradiol 0.0375mg/24hr patch: patient currently has a patch applied to the right inner thigh. This patch was applied on Thursday 7/18/24.  Levothyroxine dose was increased to 112 mcg on 7/15/24    Changes made to PTA medication list:  Added:  Mesnex 400 mg tablet: takes on infusion days (Mondays). Takes 400 mg 4 hours after infusion, and then another 400 mg 4 hours after first Mesnex dose.  Deleted:  Aerochamber plus with mask  Budesonide 0.5mg/2mL neb solution: PCP discontinued  Dexamethasone 2 mg tablet: patient completed tapering off on 7/4/24  Fludrocortisone 0.1 mg tablet: patient completed tapering off on 7/4/24  Fluticasone 50mcg nasal spray: does not use  Montelukast 10 mg tablet: PCP discontinued within the last 6 months  Prednisone 10 mg tablet: PCP discontinued within the last 6 months  Prednisone 20 mg tablet: PCP discontinued within the last 6 months  Roflumilast 500 mcg tablet: PCP discontinued within the last 6 months  Changed:  Estradiol patch: added \"twice a week on Mondays and Thursdays\" to directions  Tavneos 10 mg capsule: added \"2 times daily\" to directions  Valacyclovir 500 mg tablet: changed directions from \"as needed\" to \"2 times daily\"  Added omeprazole frequency  Levothyroxine from 100 to 112 mcg    Allergies reviewed with patient and updates made in EHR: yes    Medication History Completed By: Matty Bhat 7/20/2024 5:35 PM    Reviewed/updated by: Vickie Maddox, Charissa    PTA Med List   Medication Sig Last Dose    estradiol (VIVELLE-DOT) 0.0375 MG/24HR BIW patch Place 1 patch onto the skin twice a week on Mondays and Thursdays 7/18/2024    levothyroxine " (SYNTHROID/LEVOTHROID) 112 MCG tablet Take 112 mcg by mouth daily 7/18/2024 at AM    mesna (MESNEX) 400 MG TABS tablet Take 400 mg by mouth twice daily on infusion days 7/15/2024    omeprazole (PRILOSEC) 20 MG DR capsule Take 20 mg by mouth daily 7/18/2024 at AM    TAVNEOS 10 MG CAPS Take 30 mg by mouth 2 times daily 7/18/2024 at 2100    valACYclovir (VALTREX) 500 MG tablet Take 500 mg by mouth 2 times daily 7/18/2024 at 2100

## 2024-07-20 NOTE — PLAN OF CARE
Physical Therapy: Orders received. Chart reviewed and discussed with care team.? Physical Therapy not indicated due to patient mobilizing independently and has no acute care PT needs at this time.? Defer discharge recommendations to OT and medical team.? Will complete orders.

## 2024-07-20 NOTE — PLAN OF CARE
"Goal Outcome Evaluation:       /83 (BP Location: Right arm)   Pulse 91   Temp 98.2  F (36.8  C) (Oral)   Resp 18   Ht 1.575 m (5' 2\")   Wt 59.2 kg (130 lb 9.6 oz)   SpO2 95%   BMI 23.89 kg/m      [id number]  Reason for admission:Dilation of bronchus intermedius.   Neuro: A&Ox4.   Cardiac: SR. VSS.   Respiratory: Sating 95% on 1.5 liter.  GI/: Adequate urine output. BM X1  Diet/appetite: Tolerating regular diet. Eating fair.  Activity: Stand by assist up to chair and bathroom.  Pain: At acceptable level on current regimen.   Skin: No new deficits noted.  LDA's:PIV saline locked.  Plan: Continue with POC. Notify primary team with changes.  "

## 2024-07-21 ENCOUNTER — APPOINTMENT (OUTPATIENT)
Dept: GENERAL RADIOLOGY | Facility: CLINIC | Age: 64
End: 2024-07-21
Attending: INTERNAL MEDICINE
Payer: COMMERCIAL

## 2024-07-21 LAB
BACTERIA TISS BX CULT: NORMAL
FOLATE SERPL-MCNC: 6.1 NG/ML (ref 4.6–34.8)
HOLD SPECIMEN: NORMAL
HOLD SPECIMEN: NORMAL

## 2024-07-21 PROCEDURE — 99232 SBSQ HOSP IP/OBS MODERATE 35: CPT | Mod: GC | Performed by: INTERNAL MEDICINE

## 2024-07-21 PROCEDURE — 71045 X-RAY EXAM CHEST 1 VIEW: CPT

## 2024-07-21 PROCEDURE — 250N000013 HC RX MED GY IP 250 OP 250 PS 637

## 2024-07-21 PROCEDURE — 99233 SBSQ HOSP IP/OBS HIGH 50: CPT | Performed by: INTERNAL MEDICINE

## 2024-07-21 PROCEDURE — 120N000002 HC R&B MED SURG/OB UMMC

## 2024-07-21 PROCEDURE — 71045 X-RAY EXAM CHEST 1 VIEW: CPT | Mod: 26 | Performed by: RADIOLOGY

## 2024-07-21 PROCEDURE — 36415 COLL VENOUS BLD VENIPUNCTURE: CPT | Performed by: INTERNAL MEDICINE

## 2024-07-21 PROCEDURE — 85018 HEMOGLOBIN: CPT

## 2024-07-21 PROCEDURE — 250N000012 HC RX MED GY IP 250 OP 636 PS 637: Performed by: INTERNAL MEDICINE

## 2024-07-21 PROCEDURE — 82746 ASSAY OF FOLIC ACID SERUM: CPT | Performed by: INTERNAL MEDICINE

## 2024-07-21 PROCEDURE — 250N000013 HC RX MED GY IP 250 OP 250 PS 637: Performed by: INTERNAL MEDICINE

## 2024-07-21 RX ORDER — PREDNISONE 20 MG/1
40 TABLET ORAL DAILY
Status: DISCONTINUED | OUTPATIENT
Start: 2024-07-21 | End: 2024-07-23 | Stop reason: HOSPADM

## 2024-07-21 RX ORDER — FOLIC ACID 1 MG/1
1 TABLET ORAL DAILY
Status: DISCONTINUED | OUTPATIENT
Start: 2024-07-21 | End: 2024-07-23 | Stop reason: HOSPADM

## 2024-07-21 RX ADMIN — ACETAMINOPHEN 650 MG: 325 TABLET, FILM COATED ORAL at 16:20

## 2024-07-21 RX ADMIN — ACETAMINOPHEN 650 MG: 325 TABLET, FILM COATED ORAL at 21:44

## 2024-07-21 RX ADMIN — VALACYCLOVIR HYDROCHLORIDE 500 MG: 500 TABLET, FILM COATED ORAL at 08:24

## 2024-07-21 RX ADMIN — PANTOPRAZOLE SODIUM 40 MG: 40 TABLET, DELAYED RELEASE ORAL at 08:24

## 2024-07-21 RX ADMIN — FOLIC ACID 1 MG: 1 TABLET ORAL at 13:39

## 2024-07-21 RX ADMIN — LEVOTHYROXINE SODIUM 112 MCG: 0.11 TABLET ORAL at 08:24

## 2024-07-21 RX ADMIN — VALACYCLOVIR HYDROCHLORIDE 500 MG: 500 TABLET, FILM COATED ORAL at 21:40

## 2024-07-21 RX ADMIN — ACETAMINOPHEN 650 MG: 325 TABLET, FILM COATED ORAL at 08:23

## 2024-07-21 RX ADMIN — PREDNISONE 40 MG: 20 TABLET ORAL at 16:20

## 2024-07-21 ASSESSMENT — ACTIVITIES OF DAILY LIVING (ADL)
ADLS_ACUITY_SCORE: 24
ADLS_ACUITY_SCORE: 25
ADLS_ACUITY_SCORE: 24
ADLS_ACUITY_SCORE: 24
DEPENDENT_IADLS:: INDEPENDENT
ADLS_ACUITY_SCORE: 24
ADLS_ACUITY_SCORE: 25
ADLS_ACUITY_SCORE: 24
ADLS_ACUITY_SCORE: 24
ADLS_ACUITY_SCORE: 25
ADLS_ACUITY_SCORE: 24

## 2024-07-21 NOTE — PLAN OF CARE
Goal Outcome Evaluation:      Plan of Care Reviewed With: patient    Overall Patient Progress: no changeOverall Patient Progress: no change    Outcome Evaluation: Plan to discharge home when medically ready. Pt will need a portable O2 tank for home transport.    Marilynn Walters RN    7C RN Coordinator  Phone: [phone]  7/21/2024  Units: 7C Med Surg 7401 thru 7418 RNCC     Social Work and Care Management Department   SEARCHABLE in [facility] - search CARE COORDINATOR   [employer] ([phone]) Saturday & Sunday; ([phone]) [facility] Recognized Holidays   Units: 5A Onc 5201 - 5219 RNCC,  5A Onc 5220 thru 5240 RNCC, 5C OFFSERVICE [phone] RNCC & 5C OFF SERVICE [id number] RNCC   Units: 6B Vocera, 6C Card 6401 thru 6420 RNCC, 6C Card 6502 thru 6514 RNCC & 6C Card 6515 thru 6519 RNCC    Units: 7A SOT RNCC Vocera, 7B Med Surg Vocera, & 7C Med Surg 7502 thru 7521 RNCC   Units: 6A Vocera & 4A CVICU Vocera, 4C MICU Vocera, and 4E SICU Vocera     Units: 5 Ortho Vocera & 5 Med Surg Vocera    Units: 6 Med Surg Vocera & 8 Med Surg Vocera

## 2024-07-21 NOTE — PLAN OF CARE
"1900-0730  Vitals:  Blood pressure (!) 104/91, pulse 91, temperature 98.2  F (36.8  C), resp. rate 20, height 1.575 m (5' 2\"), weight 59.2 kg (130 lb 9.6 oz), SpO2 95%.  Neuro: A/Ox4, calls approprietly  Cardiac: VSS  Respiratory: sating >90 on 2L NC, titrate down as tolerated, productive cough, baseline sob  Diet/appetite: comb diet, Na restriction <2400  GI/:  no bm this shift, urine sample collected   Activity:  SBA  Pain: 4/10 headache, prn tylenol given  Skin: no new deficits, no adjustments needed  Lines: L PIV sl    Plan: rheumatology to consult in AM, cont to titrate down O2 needs to baseline, cont to monitor pt progress and notify team of changes per POC. Discharge today.    Problem: Adult Inpatient Plan of Care  Goal: Absence of Hospital-Acquired Illness or Injury  Outcome: Progressing  Intervention: Identify and Manage Fall Risk  Recent Flowsheet Documentation  Taken 7/20/2024 2118 by Fe Melo RN  Safety Promotion/Fall Prevention:   clutter free environment maintained   nonskid shoes/slippers when out of bed  Intervention: Prevent Skin Injury  Recent Flowsheet Documentation  Taken 7/20/2024 2118 by Fe Melo RN  Body Position: position changed independently  Skin Protection: adhesive use limited  Device Skin Pressure Protection:   absorbent pad utilized/changed   adhesive use limited  Intervention: Prevent and Manage VTE (Venous Thromboembolism) Risk  Recent Flowsheet Documentation  Taken 7/20/2024 2118 by Fe Melo RN  VTE Prevention/Management: (pt ambulating) SCDs off (sequential compression devices)  Intervention: Prevent Infection  Recent Flowsheet Documentation  Taken 7/20/2024 2118 by Fe Melo RN  Infection Prevention:   cohorting utilized   environmental surveillance performed   hand hygiene promoted   equipment surfaces disinfected   personal protective equipment utilized   rest/sleep promoted   single patient room provided     Problem: Adult Inpatient Plan of Care  Goal: " Optimal Comfort and Wellbeing  Outcome: Progressing  Intervention: Monitor Pain and Promote Comfort  Recent Flowsheet Documentation  Taken 7/20/2024 2118 by Fe Melo RN  Pain Management Interventions:   medication (see MAR)   rest     Problem: Pain Acute  Goal: Optimal Pain Control and Function  Outcome: Progressing  Intervention: Develop Pain Management Plan  Recent Flowsheet Documentation  Taken 7/20/2024 2118 by Fe Melo RN  Pain Management Interventions:   medication (see MAR)   rest  Intervention: Prevent or Manage Pain  Recent Flowsheet Documentation  Taken 7/20/2024 2118 by Fe Melo RN  Sensory Stimulation Regulation: care clustered  Sleep/Rest Enhancement:   awakenings minimized   consistent schedule promoted   family presence promoted   regular sleep/rest pattern promoted  Bowel Elimination Promotion: adequate fluid intake promoted  Medication Review/Management: medications reviewed  Intervention: Optimize Psychosocial Wellbeing  Recent Flowsheet Documentation  Taken 7/20/2024 2118 by Fe Melo RN  Supportive Measures:   active listening utilized   decision-making supported

## 2024-07-21 NOTE — PROGRESS NOTES
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 Unfortunately, she has previously been on steroids/rituximab without response (x4 doses), and 2 doses cyclophosphamide started in June, and Avacopan.     Patient presented for outpatient bronchoscopy, BAL. Bronch revealed diffusely erythematous airways and she developed oozing bleed from mucosa with biopsies and limited dilation. Developed AHRF requiring 8-10L and inpatient stay.     Patient FTH rhinovirus, likely her AHRF is 2/2 BAL fluid and sedation + viral illness in setting of underlying uncontrolled vasculitis. Defer to rheumatology regarding further immune suppression and ID regarding antimicrobials (low suspicion for acute infection, though appears she hasn't been on PJP ppx).     We do think there could be a component of reactive airways disease and recommend steroid burst at this time as well as ongoing airway clearance, mobilization, wean O2 as able, and monitor clinically.      Recommendations:   -appreciate rheum recs, we recommend infusion of cyclophosphamide prior to discharge to stay on schedule but defer for further escalation of immune supression  -5 day course of 40mg prednisone  -albuterol nebs and inhaler prn, please order on discharge  -ok to discharge tomorrow if able to ambulate without hypoxia on O2, needs 6mwt  -update home O2 order for increased needs  -agree with ID regarding PJP ppx  -will alert Dr Bailey and Dr Dixon of pending discharge tomorrow    Patient seen and discussed with Dr. Laura, pulm will continue to follow. Please reach  "out with questions.    Tiarra Maddox MD, MD on 7/21/2024 at 12:52 PM  PACCM Fellow       Attending note:  Patient seen, examined and discussed with Dr. Maddox. All data reviewed.  Agree with the assessment and plan as outlined in the above note.    Georgia Laura MD  [phone]      Subjective     SALVADOR overnight, ongoing O2 requirements, desats with walking             Review of Systems:   As per HPI, otherwise neg          Physical Exam:   Vitals were reviewed  Temp: 98.2  F (36.8  C) Temp src: Oral BP: (!) 104/91 Pulse: 91   Resp: 20 SpO2: 95 % O2 Device: Nasal cannula Oxygen Delivery: 2 LPM  Gen: tired appearing but improved from prior, NAD, nontoxic  HEENT: MMM, no icterus  Pulm: b/l wheezing, no increased WOB, dry cough  Cardio: RRR, no murmurs  Neuro: CN 2-12, moving all extremities  Psych: appropriate mood and affect          Data:     Last Comprehensive Metabolic Panel:  Lab Results   Component Value Date     07/20/2024    POTASSIUM 3.9 07/20/2024    CHLORIDE 107 07/20/2024    CO2 24 07/20/2024    ANIONGAP 10 07/20/2024     (H) 07/20/2024    BUN 16.9 07/20/2024    CR 0.47 (L) 07/20/2024    GFRESTIMATED >90 07/20/2024    RANDA 8.8 07/20/2024     Lab Results   Component Value Date    WBC 10.7 07/20/2024     Lab Results   Component Value Date    RBC 3.43 07/20/2024     Lab Results   Component Value Date    HGB 10.6 07/20/2024     Lab Results   Component Value Date    HCT 32.6 07/20/2024     No components found for: \"MCT\"  Lab Results   Component Value Date    MCV 95 07/20/2024     Lab Results   Component Value Date    MCH 30.9 07/20/2024     Lab Results   Component Value Date    MCHC 32.5 07/20/2024     Lab Results   Component Value Date    RDW 15.6 07/20/2024     Lab Results   Component Value Date     07/20/2024     CXR 7/21 improved      RVP rhinovirus positive  Anca pending    FVC 1.72 (66%)  FEV1 .97 (46%)  DLCO 18 (99%)      Tiarra Maddox MD, MD         "

## 2024-07-21 NOTE — PROGRESS NOTES
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 Dixon on 7/19 and post-procedure developed acute hypoxic respiratory failure for which patient was admitted to [facility] team for further management. IP Pulmonary, Rheumatology, and ID consulted for further workup. Patient continues to require oxygen support but has been able to wean. Viral Respiratory swab positive for Rhinovirus.      # Acute Hypoxic Respiratory Failure s/p rigid bronchosopy (7/19/2024) suspect 2/2 mucosal swelling and intraprocedure bleeding, slow improvement  # Worsening SOB and Dyspnea on exertion (prior to admission)  # Chronic  Nocturnal Hypoxia, baseline 2L NC  # ANCA vasculitis, bronchial inflammation, MPO (+) in 1/2024   # Acute on Chronic Mild Hemoptysis in setting of Pulmonary Vasculitis and recent bronchoscopy   # Immunosuppression on Cytoxan (6/24, 7/8)  # Elevated CRP  # +RhinoVirus Viral Swab (7/19)  Patient follows with rheumatology in [place].  Patient initially presented with worsening dyspnea in late 2022.  Was found to have be MPO positive.  Initially evaluated by rheumatology in 1/2024 and was placed on high dose steroids with minimal response. Biopsy in 3/13/2024 which showed granulomatous inflammation and was started on rituximab for which she received 4 doses w/ minimal response. CT imaging on 6/7/24 showed  new cavitary lesions in lungs with supraglottic stenosis. Started on Avacopan. ENT did not find any supraglottal obstruction lesions on  laryngoscope.  Patient was referred [facility] to [facility] pulmonology for rigid bronchoscopy with possible balloon dilation. Started on 1st dose of Cytoxan on 6/24 2024 and 2nd dose on 7/8/2024. Denies neurologic or renal involvement noting she gets UAs weekly in the outpatient setting, which show intermittent proteinuria and has no history of hematuria or rapidly progressive glomerulonephritis. Also had recent MRI/MRA HEAD without evidence of large vessel vasculitis although has patchy chronic small vessel ischemic changes that may reflect sequelae of vasculitis. Denies any focal neurologic symptoms and neurologic exam non-focal. Echocardiogram on 7/5/24 EF 65% and mildly dilated aorta. Stress test recommended to rule out coronary artery involvement of vasculitis.  Post-bronchoscopy on 7/19 developed acute hypoxic respiratory failure for which patient was admitted to [facility] team for further management. CXR from 7/19 with RLL opacities consistent with recent BAL and no focal signs on pneumonia on pulmonary exam. COVID negative. Viral swab + Rhino/Enterovirus.  on admission.   - on NC, cPulse Ox, wean as tolerated to baseline (baseline daytime RA, nighttime 2L NC)  - Interventional Pulmonary Consulted              - s/p Rigid bronchoscopy w/ tissue debulking balloon dilation, bronchus steroid injection and bronchial biopsy. Findings of diffusely erythematous airways (R>L) and some friable mucosa w/ bleeding s/p dilation and biopsy               - Bronch Studies: Culture normal bailey, KOH w/o fungal components, AFB pending, fungal culture pending, Surg Path pending  - Rheumatology consulted              - Rheum Labs: MPO, ANCA IgG, CRP  - ID consulted: start PJP ppx qMWF  - No indication for empiric abx at this time, no indication for steroid use at this time  - trend CBC w/ platelets  - Hold PTA Tavenous 30mg BID, Hold plan for Cytoxan Infusion 7/22   - ppx: Continue PTA Valtrex 500mg BID, start Bactrim DS qMWF  - Pulm  Toilet: Albuterol prn, Inspiratory spirometer and encourage OOB  - Obtain CXR today, consider spot diuretic use  [ ] Dispo: Will need close PCP, Pulmonary and Rheumatology follow up.     # Normocytic Anemia (stable)  # Acute on Chronic Mild Hemoptysis in setting of Pulmonary Vasculitis and recent bronchoscopy   # Low-Normal Folate Level (7/21)  Iron studies consistent w/ anemia of chronic disease. Folate low normal, start supplementation. B12 wnl.   - trend CBC   - clinically monitor hemoptysis  - start folate supplement     #Hypothyroidism  Extensive family history of hypothyroidism, no known Hashimoto's thyroiditis. Recent history of elevated TSH and increased Synthroid from 100 mcg to 112 mcg every day  -Continue PTA Synthroid     #GERD  -Continue PTA PPI 40 mg every day     #Ulcerative Colitis, not on systemic therapy  -Diagnosed in 1985 and symptoms consisted of 7 bloody diarrhea daily for several days at time.At the time, limited colonoscopy was performed with pathology concerning for UC. Initiated on sulfasalazine and prednisone; marked symptom improvement and was able to taper off of prednisone and discontinue sulfasalazine. Has had no additional UC treatment since the 1980s. Has approximately 3 stools daily which are loose but formed. Denies hematochezia recently but, on chart review, has intermittent hematochezia with blood mixed into stool. Evaluated for IBD-related chronic bronchitis in January 2024. Does not follow with gastroenterology as an outpatient and is not currently on a pharmacologic regimen to control disease. Denies hematochezia and endorses 2-3 mushy bowel movements/day.      #Atrial fibrillation s/p ablation in 2017  No documented atrial fibrillation since ablation     #Migraines w/o aura (last one in 2017)     #Impingement Syndrome, right shoulder  Last subacromial injection in ortho clinic on 2/26/24, prior subacromial injection in 2018. Does not take PRNs pain medications at home.    -Pain control with Tylenol 650 mg TID PRN           Diet: Combination Diet Low Saturated Fat Na <2400mg Diet, No Caffeine Diet    DVT Prophylaxis: Heparin SQ  Ocampo Catheter: Not present  Lines: None     Cardiac Monitoring: None  Code Status: Full Code        Disposition Plan     Medically Ready for Discharge: Anticipated in 2-4 Days      Delmy Webster MD  Medicine Service, MARGAY TEAM 1  [facility]  Securely message with [employer] (more info)  Text page via [employer] Paging/Directory   See signed in provider for up to date coverage information  ______________________________________________________________________    Interval History   No acute events overnight, afebrile. Patient reporting ongoing feeling short of breath, lyly w. Exertion reporting increasing O2 up to 3L NC with any activity. Ongoing small amounts of blood in sputum, streaks and some lumps. Reports ongoing cough, no worsening. Wants to try walking in halls today and utilizing IS. Eating and voiding well. Will get CXR this AM    Physical Exam   Vital Signs: Temp: 98.2  F (36.8  C) Temp src: Oral BP: (!) 104/91 Pulse: 91   Resp: 20 SpO2: 95 % O2 Device: Nasal cannula Oxygen Delivery: 2 LPM  Weight: 130 lbs 9.6 oz    General: alert & oriented, no acute distress as sitting up in bed, MMM, EOMI w/o scleral icterus, neck FROM, fluent speech, very pleasant and conversational   Resp: No increased WOB at rest, no upper airway stridor, diffuse loud inspiratory and expiratory wheezing with some crackles heard in LLL, on NC  Cards: RRR w/o murmurs/rubs/gallops, trace LE edema  GI: soft, nontender, nondistended, +BS  Skin: warm and well perfused, no appreciated rashes, 2 small bruise-like area on R.LE  Neuro/MSK: CN2-12i, moving all 4 extremities equally    Medical Decision Making       50 MINUTES SPENT BY ME on the date of service doing chart review, history, exam, documentation & further activities per the  note.  MANAGEMENT DISCUSSED with the following over the past 24 hours: Pulmonary and Rheumatology, ID       Data   ------------------------- PAST 24 HR DATA REVIEWED -----------------------------------------------    I have personally reviewed the following data over the past 24 hrs:    Procal: N/A CRP: 125.00 (H) Lactic Acid: N/A       Ferritin:  388 (H) % Retic:  N/A LDH:  N/A       Imaging results reviewed over the past 24 hrs:   No results found for this or any previous visit (from the past 24 hour(s)).

## 2024-07-21 NOTE — PROVIDER NOTIFICATION
Provider notified: TONIE Dumont    Time of notification: 2106  Patient status: Clarifying O2 orders, pt baseline is 2L NC overnight and none during the day, orders have a range of 2-5 L NC. Where does she need to be to be cleared for discharge regarding respiratory?  Time of response: 2109  Orders received: Ideally pt will be back to baseline, will adjust O2 orders to allow her to wean down further than 2L if tolerating.

## 2024-07-21 NOTE — CONSULTS
Care Management Initial Consult    General Information  Assessment completed with: Patient, VM-chart review, Pt Rose  Type of CM/SW Visit: Initial Assessment    Primary Care Provider verified and updated as needed: Yes (Sunita Dominguez [phone] [address] [address])   Readmission within the last 30 days: no previous admission in last 30 days      Reason for Consult: discharge planning  Advance Care Planning: Advance Care Planning Reviewed: verified with patient (Had it at home, not in [facility] system)        Communication Assessment  Patient's communication style: spoken language (English or Bilingual)    Hearing Difficulty or Deaf: no   Wear Glasses or Blind: yes    Cognitive  Cognitive/Neuro/Behavioral: WDL                      Living Environment:   People in home: spouse  Dagoberto Krishna  Current living Arrangements: house      Able to return to prior arrangements: yes     Family/Social Support:  Care provided by: self  Provides care for: no one  Marital Status:   , Children, Sibling(s)  Dagoberto Krishna       Description of Support System: Supportive, Involved    Support Assessment: Adequate family and caregiver support, Adequate social supports    Current Resources:   Patient receiving home care services: No     Community Resources: None  Equipment currently used at home: none  Supplies currently used at home: Oxygen Tubing/Supplies    Employment/Financial:  Employment Status: employed full-time        Financial Concerns: none      Does the patient's insurance plan have a 3 day qualifying hospital stay waiver?  No    Lifestyle & Psychosocial Needs:  Social Determinants of Health     Food Insecurity: No Food Insecurity (3/26/2024)    [facility]    Hunger Vital Sign     Worried About Running Out of Food in the Last Year: Never true     Ran Out of Food in the Last Year: Never true   Depression: Not at risk (3/21/2024)    PHQ-2      PHQ-2 Score: 0   Housing Stability: Low Risk  (3/26/2024)    Received from [facility]    Housing Stability Vital Sign     Unable to Pay for Housing in the Last Year: No     Number of Places Lived in the Last Year: 1     Unstable Housing in the Last Year: No   Tobacco Use: Low Risk  (7/19/2024)    Patient History     Smoking Tobacco Use: Never     Smokeless Tobacco Use: Never     Passive Exposure: Not on file   Financial Resource Strain: Low Risk  (3/26/2024)    Received from [facility]    Overall Financial Resource Strain (CARDIA)     Difficulty of Paying Living Expenses: Not very hard   Alcohol Use: Not At Risk (3/26/2024)    Received from [facility]    AUDIT-C     Frequency of Alcohol Consumption: Never     Average Number of Drinks: Patient does not drink     Frequency of Binge Drinking: Never   Transportation Needs: No Transportation Needs (3/26/2024)    Received from [facility]    PRAPARE - Transportation     Lack of Transportation (Medical): No     Lack of Transportation (Non-Medical): No   Physical Activity: Insufficiently Active (3/26/2024)    Received from [facility]    Exercise Vital Sign     Days of Exercise per Week: 4 days     Minutes of Exercise per Session: 20 min   Interpersonal Safety: Not At Risk (2/7/2023)    Received from [facility], [facility]    Humiliation, Afraid, Rape, and Kick questionnaire     Fear of Current or Ex-Partner: No     Emotionally Abused: No     Physically Abused: No     Sexually Abused: No   Stress: No Stress Concern Present (3/26/2024)    Received from [facility]    [employer] Occup[employer] - Occupational Stress Questionnaire     Feeling of Stress : Only a little   Social Connections: Socially Integrated (3/26/2024)    Received from [facility]    Social Connection and Isolation Panel [NHANES]  "    Frequency of Communication with Friends and Family: More than three times a week     Frequency of Social Gatherings with Friends and Family: More than three times a week     Attends Taoist Services: More than 4 times per year     Active Member of Clubs or Organizations: Yes     Attends Club or Organization Meetings: 1 to 4 times per year     Marital Status:    Health Literacy: Not on file     Functional Status:  Prior to admission patient needed assistance:   Dependent ADLs:: Independent  Dependent IADLs:: Independent     Mental Health Status:  Mental Health Status: No Current Concerns       Chemical Dependency Status:  Chemical Dependency Status: No Current Concerns           Values/Beliefs:  Spiritual, Cultural Beliefs, Taoist Practices, Values that affect care: yes  Description of Beliefs that Will Affect Care: Briana          Additional Information:  Background: \"Rose Krishna is a 64 year old female admitted on 7/19/2024. She has a history of hypothyroidism, ulcerative colitis, atrial fibrillation s/p ablation in 2017 and  ANCA vasculitis with granulomas on biopsy (3/13/24) now s/p Rituximab infusions x 4, s/p salumedrol x 3 and s/p prednisone with Bactrim prophy now on Tavenous 30mg BID, Cyotoxin (next infusion on 7/22) and [place] during infusions who presented with acute hypoxic respiratory failure after rigid bronchoscopy with balloon dilation and BAL. Differential diagnosis includes pulmonary infection in the setting of Cyotoxin immunosuppression, progression of underlying ANCA small vessel vasculitis, or post-bronchoscopy complication (less likely given benign CXR and symmetric lung sounds). Patient worked up for pulmonary involvement of underlying IBD in the outpatient setting, which was negative\" (Georgia Joseph MD).     Care management assessment completed at the bedside with the ptRose, d/t discharge planning needed. RNCC introduced self and explained the nature of the care " management assessment. Pt agreed to speak with RNCC. The pt verified address, PCP, and health insurance in chart is current.     Rose lives in the house with her spouse Dagoberto. Their two adult daughters live close by and are their strong support. Rose also has siblings that can be there for her whenever she needs them. Rose is ind. She used 2LNC of O2 supplement from [facility] in [place], [place] at night. There are potential she will need it continuously. 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 She is currently in STD. Rose denied any concerns. She requested for writer to contact [facility] tomorrow and have them deliver the portable tank for home transportation at discharge.     Continue to monitor and plan safe discharge.    [facility]:  [facility] [place], [place]  P: [phone]  F: [phone]  Needs to fax new order on the day of discharge.    Marilynn Walters RN    7C RN Coordinator  Phone: [phone]  7/21/2024  Units: 7C Med Surg 7401 thru 7418 RNCC     Social Work and Care Management Department   SEARCHABLE in [facility] - search CARE COORDINATOR   [facility] ([phone]) Saturday & Sunday; ([phone]) [facility] Recognized Holidays   Units: 5A Onc 5201 - 5219 RNCC,  5A Onc 5220 thru 5240 RNCC, 5C OFFSERVICE [phone] RNCC & 5C OFF SERVICE [phone] RNCC   Units: 6B Vocera, 6C Card 6401 thru 6420 RNCC, 6C Card 6502 thru 6514 RNCC & 6C Card 6515 thru 6519 RNCC    Units: 7A SOT RNCC Vocera, 7B Med Surg Vocera, & 7C Med Surg 7502 thru 7521 RNCC   Units: 6A Vocera & 4A CVICU Vocera, 4C MICU Vocera, and 4E SICU Vocera     Units: 5 Ortho Vocera & 5 Med Surg Vocera    Units: 6 Med Surg Vocera & 8 Med Surg Vocera

## 2024-07-22 DIAGNOSIS — J84.9 ILD (INTERSTITIAL LUNG DISEASE) (H): ICD-10-CM

## 2024-07-22 DIAGNOSIS — I77.82 ANCA-ASSOCIATED VASCULITIS (H): Primary | ICD-10-CM

## 2024-07-22 LAB
ANCA AB PATTERN SER IF-IMP: NORMAL
C-ANCA TITR SER IF: NORMAL {TITER}
ERYTHROCYTE [DISTWIDTH] IN BLOOD BY AUTOMATED COUNT: 15.7 % (ref 10–15)
HCT VFR BLD AUTO: 31.7 % (ref 35–47)
HGB BLD-MCNC: 10.5 G/DL (ref 11.7–15.7)
MCH RBC QN AUTO: 31.4 PG (ref 26.5–33)
MCHC RBC AUTO-ENTMCNC: 33.1 G/DL (ref 31.5–36.5)
MCV RBC AUTO: 95 FL (ref 78–100)
PLATELET # BLD AUTO: 381 10E3/UL (ref 150–450)
RBC # BLD AUTO: 3.34 10E6/UL (ref 3.8–5.2)
WBC # BLD AUTO: 10.9 10E3/UL (ref 4–11)

## 2024-07-22 PROCEDURE — 99232 SBSQ HOSP IP/OBS MODERATE 35: CPT | Mod: GC | Performed by: INTERNAL MEDICINE

## 2024-07-22 PROCEDURE — 120N000002 HC R&B MED SURG/OB UMMC

## 2024-07-22 PROCEDURE — 99233 SBSQ HOSP IP/OBS HIGH 50: CPT | Performed by: STUDENT IN AN ORGANIZED HEALTH CARE EDUCATION/TRAINING PROGRAM

## 2024-07-22 PROCEDURE — 94762 N-INVAS EAR/PLS OXIMTRY CONT: CPT

## 2024-07-22 PROCEDURE — 250N000013 HC RX MED GY IP 250 OP 250 PS 637: Performed by: INTERNAL MEDICINE

## 2024-07-22 PROCEDURE — 250N000013 HC RX MED GY IP 250 OP 250 PS 637

## 2024-07-22 PROCEDURE — 250N000012 HC RX MED GY IP 250 OP 636 PS 637: Performed by: INTERNAL MEDICINE

## 2024-07-22 PROCEDURE — 250N000011 HC RX IP 250 OP 636

## 2024-07-22 PROCEDURE — 999N000157 HC STATISTIC RCP TIME EA 10 MIN

## 2024-07-22 RX ORDER — PREDNISONE 20 MG/1
40 TABLET ORAL DAILY
Qty: 3 TABLET | Refills: 0 | Status: SHIPPED | OUTPATIENT
Start: 2024-07-22 | End: 2024-07-23

## 2024-07-22 RX ORDER — SULFAMETHOXAZOLE/TRIMETHOPRIM 800-160 MG
1 TABLET ORAL
Qty: 12 TABLET | Refills: 0 | Status: SHIPPED | OUTPATIENT
Start: 2024-07-22 | End: 2024-08-21

## 2024-07-22 RX ORDER — FOLIC ACID 1 MG/1
1 TABLET ORAL DAILY
Qty: 30 TABLET | Refills: 0 | Status: SHIPPED | OUTPATIENT
Start: 2024-07-22 | End: 2024-08-21

## 2024-07-22 RX ORDER — ACETAMINOPHEN 325 MG/1
975 TABLET ORAL 3 TIMES DAILY
Status: DISCONTINUED | OUTPATIENT
Start: 2024-07-22 | End: 2024-07-23 | Stop reason: HOSPADM

## 2024-07-22 RX ORDER — FLUTICASONE PROPIONATE 50 MCG
1-2 SPRAY, SUSPENSION (ML) NASAL DAILY PRN
Qty: 9.9 ML | Refills: 0 | Status: SHIPPED | OUTPATIENT
Start: 2024-07-22 | End: 2024-10-29

## 2024-07-22 RX ORDER — ALBUTEROL SULFATE 90 UG/1
2 AEROSOL, METERED RESPIRATORY (INHALATION) EVERY 4 HOURS PRN
Qty: 18 G | Refills: 0 | Status: SHIPPED | OUTPATIENT
Start: 2024-07-22

## 2024-07-22 RX ADMIN — ACETAMINOPHEN 975 MG: 325 TABLET, FILM COATED ORAL at 20:17

## 2024-07-22 RX ADMIN — LEVOTHYROXINE SODIUM 112 MCG: 0.11 TABLET ORAL at 10:20

## 2024-07-22 RX ADMIN — SULFAMETHOXAZOLE AND TRIMETHOPRIM 1 TABLET: 800; 160 TABLET ORAL at 10:25

## 2024-07-22 RX ADMIN — ACETAMINOPHEN 975 MG: 325 TABLET, FILM COATED ORAL at 13:55

## 2024-07-22 RX ADMIN — VALACYCLOVIR HYDROCHLORIDE 500 MG: 500 TABLET, FILM COATED ORAL at 20:18

## 2024-07-22 RX ADMIN — PROCHLORPERAZINE EDISYLATE 5 MG: 5 INJECTION INTRAMUSCULAR; INTRAVENOUS at 10:21

## 2024-07-22 RX ADMIN — ACETAMINOPHEN 975 MG: 325 TABLET, FILM COATED ORAL at 10:20

## 2024-07-22 RX ADMIN — PANTOPRAZOLE SODIUM 40 MG: 40 TABLET, DELAYED RELEASE ORAL at 10:20

## 2024-07-22 RX ADMIN — FOLIC ACID 1 MG: 1 TABLET ORAL at 10:21

## 2024-07-22 RX ADMIN — PREDNISONE 40 MG: 20 TABLET ORAL at 10:20

## 2024-07-22 RX ADMIN — VALACYCLOVIR HYDROCHLORIDE 500 MG: 500 TABLET, FILM COATED ORAL at 10:20

## 2024-07-22 ASSESSMENT — ACTIVITIES OF DAILY LIVING (ADL)
ADLS_ACUITY_SCORE: 24

## 2024-07-22 NOTE — PLAN OF CARE
"Goal Outcome Evaluation:      Plan of Care Reviewed With: patient, spouse    Overall Patient Progress: improving    Outcome Evaluation: A&Ox4, VSS on 2L NC. C/o headache this AM, causing nausea. Given tylenol and IV compazine with adequate relief. Encouraging patient to ambulate. Patient showered and gown changed. Walking & sleep O2 assessments completed, waiting for transportation O2 tank. Continue w/POC with potential discharge tomorrow.    /85 (BP Location: Right arm)   Pulse 80   Temp 98.7  F (37.1  C) (Oral)   Resp 18   Ht 1.575 m (5' 2\")   Wt 58.7 kg (129 lb 6.4 oz)   SpO2 97%   BMI 23.67 kg/m       Susan Sharma RN on 7/22/2024 at 6:40 PM       "

## 2024-07-22 NOTE — PROGRESS NOTES
"  Rose Krishna MRN# [id number]   YOB: 1960 Age: 64 year old     Height: 5' 2\"  Weight (lbs): 130 lbs 4.8 oz Weight (kg): 59.1 kg (actual weight)    Supplemental oxygen during the test: Yes, flow 2-4 L/min    Oxygen Appliance: Nasal Cannula    Oximetry: Finger Probe    Gait Aid: None     Pre-test Post-test   Time 1817   Blood Pressure (mm Hg) 122/80 124/78   Heart rate (bpm) 85 92   Rated Perceived Dyspnea (Katelyn Scale) 1 -- Very mild shortness of breath  3 -- Moderate shortness of breath or breathing difficulty    Rated Perceived Exertion (Katelyn Scale) 3 -- Moderate  4 -- Somewhat strong    SpO2 (%) 92% 2L NC 94% 4L NC     Total distance walked in 6 minutes: walked from 402 to sunroom on unit and back    Paused during test? Yes  Number of pauses: 1  Total Time stopped: 1 minute    Stopped test before 6 minutes? No  Time completed:   Distance: see above   Reason: test wasn't stopped    Did the patient experience any pain or discomfort during the test? No  Pain Ratin/10  Pain Description: Dizziness  Comments: Dizziness in beginning, quickly recovered and was able to tolerate walking with 2-4 L NC    Oxygen Titration Required: Yes  Resting oxygen requirement: 2 Liters on Nasal Cannula  Exercise oxygen requirement: 4 Liters on Nasal Cannula    Performing Staff: Susie Eldridge RN  "

## 2024-07-22 NOTE — PROGRESS NOTES
[facility] ID Service: PROGRESS/SIGN OFF NOTE     Patient:  Rose Krishna, Date of birth 1960, Medical record number [id number]  Date of Visit:  July 22, 2024           Assessment and Recommendations:   ID Problem List:  Acute hypoxic respiratory failure - improved  Now on 2LPM  ANCA vasculitis  Immunosuppressive therapy requiring PJP prophylaxis  Ulcerative colitis  5. Rhinovirus +    Discussion:  63yo F with h/o hypothyroidism, UC, afib s/p ablation (2017), and ANCA vasculitis admitted for planned rigid bronchoscopy and balloon dilation related to ANCA vasculitis and subsequent stenosis. ID consulted due to concern for pulmonary infection.    ID has felt low suspicion for bacterial pulmonary infection. More likely, swelling/fluid from recent procedure could explain transient increased O2 needs - CXR 7/19 suggested possible fluid associated with bronchoscopy. rEpeat CXr yesterday 7/21 showed this had resolved. Rhinovirus on respiratory panel could also be contributing. Gven her immunesuppresison, and intermittent steroids, ID recommended restarting her TMP-SMX, and also continuing valacyclovir prophylaxis. We held off on other  antimicrobials at this time, pending path/cultures from bronch.    6/62024 CT chest reviewed - Patchy groundglass opacities, bronchial wall thickening and cavitary nodule right upper lobe compatible with ANCA vasculitis, with groundglass opacities likely representing mild alveolar hemorrhage likely accounting for hemoptysis. Has had a neg QFt prior to Rituximab per note.     Bronch studies: Normal bailey on aerobic cultures, KOH w no fungal elements,  AFB and fungal cx remain w NGTD from 7/19. Pt remains stable off antibiotics. Path pending      Recommendations:  Continue TMP-SMX for PJP prophylaxis. Can re-evaluate need as outpatient base on Pulm discretion.  Would hold off on further antibiotics at this time - low suspicion for bacterial pulmonary infection  Suspect known  vasculitis process is the primary  of changes seen on bronchoscopy  No directed therapy for rhinovirus, just supportive care  Follow pending path and bronch cultures  ID will sign off at this time, please call if questions/new issues      I have reviewed interval events, vital, labs, images, cultures and antibiotics    Total time on day of visit including chart review, visit, counseling, documentation and coordination of care: 40 minutes    Hanane Ruiz  Staff Physician  Division of Infectious Diseases           Interval events     Afebrile, stable, on 2L O2, WBc normal. Pt reports overall doing ok. Did a walk test yesterday needed 4L, today was 3L per SO by bedside. No fevers, chills. Bactrim was on till beginning of July, stopped once her steroids were tapered off. Some hemoptysis per notes         History of Present Illness:     65yo F with h/o hypothyroidism, UC, afib s/p ablation (2017), and ANCA vasculitis admitted for planned rigid bronchoscopy and balloon dilation related to ANCA vasculitis and subsequent stenosis. ID consulted due to concern for pulmonary infection.    On bronchoscopy (7/19), pulmonologist noted diffusely inflamed airways (R>L). No discrete scar tissue noted in the bronchus intermdeius, though much of the inflamed mucosa was friable. Dilation was performed and endobronchial lesions were performed and sent for path and culture. Pt has remained afebrile throughout admission with normal WBC (10.7), though with elevated CRP to 125. No abx have been given, though valacyclovir was continued.     With regard to her ANCA vasculitis, it has mostly presented itself as bronchial inflammation. She received rituxumab x 4 infusions (last in April 2024), followed by Tanveous June 2024, and then received cytoxan 6/24/24 with subsequent periorbital edema. The plan was to continue cyclophosphamide and then maintenance rituximab or imhuran. She also received premedication with benadryl and 40mg  methylprednisolone before last cyclophosphamide on 7/8/24. She was started on valacyclovir prophylaxis by rheumatology due to immunosuppression and history of HSV. She was started on TMP-SMX in March 2024 when prednisone 60mg was started due to granulomatous inflammation seen on bronchoscopy-collected biopsy - concerning for vasculitis. Plan at that time was to taper prednisone over 3 months once rituximab therapy was started (first dose on 4/5/24). She received 4 doses, with plans (per [facility]) to repeat this in October. After pt reported worsening (headache, achiness, dyspnea, stridor) in May 2024, [facility] added Tanveous (5/29/24), increased prednisone back to 60mg daily x 2 weeks. TMP-SMX was briefly (10 days) increased to treatment dose on 6/6/24 before backing down to prophylaxis dose due to worsened bronchial symptoms. 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 started cyclophosphamide - received first on 6/24/24 (continued TMP-SMX prophylaxis and added valacyclovir prophylaxis). [facility] performed flexible laryngoscopy on 6/10 and did not see supraglottic stenosis.          Past Medical History:     Past Medical History:   Diagnosis Date    PONV (postoperative nausea and vomiting)      Past Surgical History:   Procedure Laterality Date    INJECT STEROID (LOCATION) N/A 7/19/2024    Procedure: steroid injection,bronchial biopsy.;  Surgeon: Kemal Dixon MD;  Location: [facility] OR    LASER CO2 BRONCHOSCOPY N/A 7/19/2024    Procedure: BRONCHOSCOPY, RIGID, tissue debulking balloon dilation,;  Surgeon: Kemal Dixon MD;  Location: [facility] OR         Allergies:      Allergies   Allergen Reactions    Sumatriptan Other (See Comments), Shortness Of Breath, Swelling and Anaphylaxis     Throat          Family History:   History reviewed. No pertinent family history.       Social History:     Social History     Socioeconomic History     Marital status:      Spouse name: Not on file    Number of children: Not on file    Years of education: Not on file    Highest education level: Not on file   Occupational History    Not on file   Tobacco Use    Smoking status: Never    Smokeless tobacco: Never   Substance and Sexual Activity    Alcohol use: Not Currently    Drug use: Never    Sexual activity: Not on file   Other Topics Concern    Not on file   Social History Narrative    Not on file     Social Determinants of Health     Financial Resource Strain: Low Risk  (3/26/2024)    Received from [facility]    Overall Financial Resource Strain (CARDIA)     Difficulty of Paying Living Expenses: Not very hard   Food Insecurity: No Food Insecurity (3/26/2024)    Received from [facility]    Hunger Vital Sign     Worried About Running Out of Food in the Last Year: Never true     Ran Out of Food in the Last Year: Never true   Transportation Needs: No Transportation Needs (3/26/2024)    Received from [facility]    PRAPARE - Transportation     Lack of Transportation (Medical): No     Lack of Transportation (Non-Medical): No   Physical Activity: Insufficiently Active (3/26/2024)    Received from [facility]    Exercise Vital Sign     Days of Exercise per Week: 4 days     Minutes of Exercise per Session: 20 min   Stress: No Stress Concern Present (3/26/2024)    Received from [facility]    [employer] - Occupational Stress Questionnaire     Feeling of Stress : Only a little   Social Connections: Socially Integrated (3/26/2024)    Received from [facility]    Social Connection and Isolation Panel [NHANES]     Frequency of Communication with Friends and Family: More than three times a week     Frequency of Social Gatherings with Friends and Family: More than three times a week     Attends Rastafari Services: More than 4 times  per year     Active Member of Clubs or Organizations: Yes     Attends Club or Organization Meetings: 1 to 4 times per year     Marital Status:    Interpersonal Safety: Not At Risk (2/7/2023)    [facility]    Humiliation, Afraid, Rape, and Kick questionnaire     Fear of Current or Ex-Partner: No     Emotionally Abused: No     Physically Abused: No     Sexually Abused: No   Housing Stability: Low Risk  (3/26/2024)    Received from [facility]    Housing Stability Vital Sign     Unable to Pay for Housing in the Last Year: No     Number of Places Lived in the Last Year: 1     Unstable Housing in the Last Year: No          Physical Exam:   Ranges forvital signs:  Temp:  [98.1  F (36.7  C)-98.8  F (37.1  C)] 98.1  F (36.7  C)  Pulse:  [81-87] 86  Resp:  [16-18] 16  BP: (100-148)/(59-93) 148/91  SpO2:  [93 %-98 %] 95 %    Intake/Output Summary (Last 24 hours) at 7/20/2024 1052  Last data filed at 7/20/2024 0319  Gross per 24 hour   Intake 640 ml   Output --   Net 640 ml     Exam:  GENERAL:  well-developed, well-nourished, sitting in bed in no acute distress. On 2lpm by NC  ENT:  Head is normocephalic, atraumatic.   EYES:  Eyes have anicteric sclerae.    LUNGS:  Breathing comfortably, on O2  SKIN:  No acute rashes.    NEUROLOGIC:  Grossly nonfocal.         Laboratory Data:     Hematology Studies    Recent Labs   Lab Test 07/21/24  0538 07/20/24  0555 06/06/24  1248 03/21/24  0848   WBC 10.9 10.7 13.4* 8.5   ANEU  --   --  11.3*  --    AEOS  --   --  0.0  --    HGB 10.5* 10.6* 14.0 13.1   MCV 95 95 91 90    362 338 270     Metabolic Studies     Recent Labs   Lab Test 07/20/24  0555 06/06/24  1248 03/21/24  0848    139 142   POTASSIUM 3.9 4.0 3.3*   CHLORIDE 107 100 102   CO2 24 26 27   BUN 16.9 19.0 20.0   CR 0.47* 0.74 0.78   GFRESTIMATED >90 90 84     Hepatic Studies    Recent Labs   Lab Test 06/06/24  1248 03/21/24  0871    BILITOTAL 0.3 0.5   ALKPHOS 52 60   ALBUMIN 4.3 4.3   AST 14 11   ALT 15 8     Microbiology:  Culture   Date Value Ref Range Status   07/19/2024 No growth after 2 days  Preliminary   07/19/2024 2+ Normal bailey  Final     Urine Studies    Recent Labs   Lab Test 07/20/24 2036 03/21/24  0857   LEUKEST Negative Negative   WBCU <1 2

## 2024-07-22 NOTE — PROGRESS NOTES
[facility] Inpatient Rheumatology Consultation  Date of service: July 21, 2024    Patient name: Rose Krishna  YOB: 1960  MRN: [id number]    Reason for consult: ANCA vasculitis     HPI:    The patient was seen and examined at bedside. She is now down to 2 L O2 which is what she uses at bedtime and not during the day. She reports having some chest discomfort post bronchoscopy.     Rheumatology review of systems: 10 point ros neg except as above.     Past Medical History:  Past Medical History:   Diagnosis Date    PONV (postoperative nausea and vomiting)        Past Surgical History:  History reviewed. No pertinent surgical history.    Medications:  Current Facility-Administered Medications   Medication Dose Route Frequency Provider Last Rate Last Admin    acetaminophen (TYLENOL) tablet 650 mg  650 mg Oral Q6H PRN Carol Newman MD   650 mg at 07/21/24 1620    albuterol (PROVENTIL) neb solution 2.5 mg  2.5 mg Nebulization Q6H PRN Tiarra Maddox MD        [Held by provider] Avacopan CAPS 30 mg  30 mg Oral BID Delmy Webster MD   30 mg at 07/19/24 2209    fluticasone (FLONASE) 50 MCG/ACT spray 1-2 spray  1-2 spray Both Nostrils Daily PRN Georgia Joseph MD        folic acid (FOLVITE) tablet 1 mg  1 mg Oral Daily Delmy Webster MD   1 mg at 07/21/24 1339    levothyroxine (SYNTHROID/LEVOTHROID) tablet 112 mcg  112 mcg Oral Daily Delmy Webster MD   112 mcg at 07/21/24 0824    lidocaine (LMX4) cream   Topical Q1H PRN Georgia Joseph MD        lidocaine 1 % 0.1-1 mL  0.1-1 mL Other Q1H PRN Georgia Joseph MD        pantoprazole (PROTONIX) EC tablet 40 mg  40 mg Oral QAM AC Georgia Joseph MD   40 mg at 07/21/24 0824    polyethylene glycol (MIRALAX) Packet 17 g  17 g Oral BID PRN Georgia Joseph MD        predniSONE (DELTASONE) tablet 40 mg  40 mg Oral Daily Delmy Webster MD   40 mg at 07/21/24 1620    senna-docusate (SENOKOT-S/PERICOLACE) 8.6-50 MG per tablet 1 tablet  1 tablet Oral BID PRN  "Georgia Joseph MD        Or    senna-docusate (SENOKOT-S/PERICOLACE) 8.6-50 MG per tablet 2 tablet  2 tablet Oral BID PRN Georgia Joseph MD        sodium chloride (PF) 0.9% PF flush 3 mL  3 mL Intracatheter Q8H Georgia Joseph MD   3 mL at 07/21/24 1620    sodium chloride (PF) 0.9% PF flush 3 mL  3 mL Intracatheter q1 min prn Georgia Joseph MD   3 mL at 07/20/24 0847    [START ON 7/22/2024] sulfamethoxazole-trimethoprim (BACTRIM DS) 800-160 MG per tablet 1 tablet  1 tablet Oral Once per day on Monday Wednesday Friday Carol Newman MD        valACYclovir (VALTREX) tablet 500 mg  500 mg Oral Q12H JOSÉM IGUEL (08/20) Delmy Webster MD   500 mg at 07/21/24 0824       Allergies:  Allergies   Allergen Reactions    Sumatriptan Other (See Comments), Shortness Of Breath, Swelling and Anaphylaxis     Throat       Family history:  No family history of autoimmune disease    Social History:  ETOH:   Smoking:   Drug use:  Occupation:    Objective:  /82   Pulse 87   Temp 98.8  F (37.1  C) (Oral)   Resp 18   Ht 1.575 m (5' 2\")   Wt 59.1 kg (130 lb 4.8 oz)   SpO2 93%   BMI 23.83 kg/m    GEN: sitting up unassisted, on O2 by nasal cannula  HEENT: No facial rash, sclera clear  CV: RRR, no m/r/g  Pulm: wheezing + bilaterally  Extremities: no synovitis   Skin: No acute cutaneous changes     WBC Count   Date Value Ref Range Status   07/20/2024 10.7 4.0 - 11.0 10e3/uL Final     Hemoglobin   Date Value Ref Range Status   07/20/2024 10.6 (L) 11.7 - 15.7 g/dL Final     Platelet Count   Date Value Ref Range Status   07/20/2024 362 150 - 450 10e3/uL Final     Creatinine   Date Value Ref Range Status   07/20/2024 0.47 (L) 0.51 - 0.95 mg/dL Final     Lab Results   Component Value Date    ALKPHOS 52 06/06/2024     AST   Date Value Ref Range Status   06/06/2024 14 0 - 45 U/L Final     Comment:     Reference intervals for this test were updated on 6/12/2023 to more accurately reflect our healthy population. There may be differences " "in the flagging of prior results with similar values performed with this method. Interpretation of those prior results can be made in the context of the updated reference intervals.     Lab Results   Component Value Date    ALT 15 06/06/2024     No results found for: \"SED\"  No results found for: \"CRP\"  UA RESULTS:  Recent Labs   Lab Test 07/20/24 2036   COLOR Yellow   APPEARANCE Clear   URINEGLC Negative   URINEBILI Negative   URINEKETONE Trace*   SG 1.035   UBLD Negative   URINEPH 6.5   PROTEIN 20*   NITRITE Negative   LEUKEST Negative   RBCU <1   WBCU <1      Myeloperoxidase Antibody IgG   Date Value Ref Range Status   06/06/2024 Negative Negative Final       Imaging    Exam: XR CHEST PORT 1 VIEW, 7/21/2024 10:09 AM     Indication: repeat CXR given prolonged hypoxia     Comparison: Chest radiograph 7/19/2024.     Findings:   Portable chest. Trachea is more midline with less rightward deviation  compared to prior. Cardiomediastinal contours are within normal  limits. Significant interval improvement of mixed airspace and  interstitial opacities in the right lower lung compared to prior  study. Left thorax remains clear. No significant effusion. No  pneumothorax. No acute osseous abnormality. Upper abdomen  unremarkable.                                                                      Impression: Significant interval improvement of airspace and  interstitial opacities in the right lower lobe compared to prior  study.    6/28  MR Brain     IMPRESSION:   1. No evidence of acute intracranial abnormality.   2.  Mild-to-moderate atrophy and patchy chronic small vessel ischemic changes which may reflect sequela of the patient's known vasculitis.   3.  Left maxillary sinus disease.      Assessment and Plan     ANCA vasculitis   Comment : c ANCA pos, MPO pos. CT Chest - GGO. Clinical manifestations include respiratory symptoms. S/p RTX on 4/5/24 and 4/12/24 and cytoxan on 6/24 and 7/8.   Plan :   Await MPO levels. "   Will hold off on immunosuppression given pos rhino virus.  Follow up with own rheumatologist within 1-2 weeks.     Long term use of high-risk medication   Comment : cyclophosphamide  Plan : Patient agreed to proceed with cyclophosphamide infusion.Side effects of cyclophosphamide including but not limited to cytopenias, inections, cardiotoxicity, hemorrhagic cystitis, malignancy, pulmonary and hepatotoxicity were discussed with the patient.     Acute hypoxic respiratory failure  Comment : on nasal cannula, rhino pos  Plan : per GEORGE Brown MD  Rheumatology

## 2024-07-22 NOTE — PROGRESS NOTES
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 Dixon on 7/19 and post-procedure developed acute hypoxic respiratory failure for which patient was admitted to [facility] team for further management. IP Pulmonary, Rheumatology, and ID consulted for further workup. Patient continues to require oxygen support but has been able to wean. Viral Respiratory swab positive for Rhinovirus.     Today  -Stable hemoptysis with continued cough, exertional desats, and O2 requirement at rest.   -Continue to hold off on immunosuppression this week (patient originally planned to receive cyclophosphamide today). Patient to reschedule cyclophosphamide infusions (every other Monday) for 7/29  -Continue to try to wean to RA. Per conversations with pulm, can go home on oxygen and continue to wean at home.   -6 minute walk completed by nursing requiring 2L while awke and 4L with activity  -Plan to touch base with care coordinator about oxygen needs at discharge.  -Repeat CXR tomorrow given recent initiation of prednisone on 7/21  -Scheduled tylenol and PRN compazine     # Acute Hypoxic Respiratory Failure s/p rigid bronchosopy (7/19/2024) suspect 2/2 mucosal swelling and intraprocedure bleeding, slow improvement  # Worsening SOB and Dyspnea on exertion (prior to admission)  # Chronic  Nocturnal Hypoxia, baseline 2L NC  # ANCA vasculitis, bronchial inflammation, MPO (+) in 1/2024   # Acute on Chronic Mild Hemoptysis in setting of Pulmonary Vasculitis and recent  bronchoscopy   # Immunosuppression on Cytoxan (6/24, 7/8)  # Elevated CRP  # +RhinoVirus Viral Swab (7/19)  Patient follows with rheumatology in [place]. Initially presented with worsening dyspnea in late 2022, MPO positive ANCA vasculitis. Rheum started high dose steroids with minimal response in Jan 2024. Biopsy 3/2024 granulomatous inflammation and s/p rituximab x4 doses w/ minimal response. CT imaging on 6/7/24 showed new cavitary lesions in lungs with supraglottic stenosis. Started on Avacopan. ENT evaluated with laryngoscope; no supraglottal obstruction. Referred to [facility] pulmonology for rigid bronchoscopy with possible balloon dilation. Started on 1st dose of Cytoxan on 6/24 2024 and 2nd dose on 7/8/2024. No renal involvement (gets weekly Uas), has intermittent proteinuria, no h/o of hematuria. Recent MRI/MRA HEAD without large vessel vasculitis, has chronic small vessel ischemic changes likely 2/2 vasculitis. No neuro deficits. Echocardiogram on 7/5/24 EF 65%, no known coronary aa involvement but needs stress test.  Post-bronchoscopy on 7/19 developed acute hypoxic respiratory failure, admitted to [facility] team for further management. CXR from 7/19 with RLL opacities consistent with recent BAL and no focal signs on pneumonia on pulmonary exam, improved on CXR 7/21. COVID negative. Viral swab + Rhino/Enterovirus.  on admission.   - on NC, cPulse Ox, wean as tolerated to baseline (baseline daytime RA, nighttime 2L NC)  -Can discharge on O2  - Interventional Pulmonary Consulted              - s/p Rigid bronchoscopy w/ tissue debulking balloon dilation, bronchus steroid injection and bronchial biopsy. Findings of diffusely erythematous airways (R>L) and some friable mucosa w/ bleeding s/p dilation and biopsy               - Bronch Studies: Culture normal bailey, KOH w/o fungal components, AFB pending, fungal culture pending, Surg Path pending  - Rheumatology consulted              - Rheum Labs: MPO, ANCA  IgG, CRP  - ID consulted: start PJP ppx qMWF  - No indication for empiric abx at this time, no indication for steroid use at this time  - trend CBC w/ platelets  - Hold PTA Tavenous 30mg BID, Hold plan for Cytoxan Infusion 7/22. Can restart on 7/29  - ppx: Continue PTA Valtrex 500mg BID, start Bactrim DS qMWF  - Pulm Toilet: Albuterol prn, Inspiratory spirometer and encourage OOB  - Obtain CXR today, consider spot diuretic use  [ ] Dispo: Will need close PCP, Pulmonary and Rheumatology follow up. Plan to resume cytoxan infusion on 7/29     #Tension Headache  -Band like distribution associated with neck pain and coughing. Likely tension headache. No red flag symptoms. Nonfocal neurologic exam  -975mg TID Acetaminophen SCHEDULED  -Compazine IV 1x, can give additional PRN     # Normocytic Anemia (stable)  # Acute on Chronic Mild Hemoptysis in setting of Pulmonary Vasculitis and recent bronchoscopy   # Low-Normal Folate Level (7/21)  Iron studies consistent w/ anemia of chronic disease. Folate low normal, start supplementation. B12 wnl.   - trend CBC   - clinically monitor hemoptysis  - start folate supplement     #Hypothyroidism  Extensive family history of hypothyroidism, no known Hashimoto's thyroiditis. Recent history of elevated TSH and increased Synthroid from 100 mcg to 112 mcg every day  -Continue PTA Synthroid     #GERD  -Continue PTA PPI 40 mg every day     #Ulcerative Colitis, not on systemic therapy  -Diagnosed in 1985 and symptoms consisted of 7 bloody diarrhea daily for several days at time.At the time, limited colonoscopy was performed with pathology concerning for UC. Initiated on sulfasalazine and prednisone; marked symptom improvement and was able to taper off of prednisone and discontinue sulfasalazine. Has had no additional UC treatment since the 1980s. Has approximately 3 stools daily which are loose but formed. Denies hematochezia recently but, on chart review, has intermittent hematochezia with  "blood mixed into stool. Evaluated for IBD-related chronic bronchitis in January 2024. Does not follow with gastroenterology as an outpatient and is not currently on a pharmacologic regimen to control disease. Denies hematochezia and endorses 2-3 mushy bowel movements/day.      #Atrial fibrillation s/p ablation in 2017  No documented atrial fibrillation since ablation     #Migraines w/o aura (last one in 2017)     #Impingement Syndrome, right shoulder  Last subacromial injection in ortho clinic on 2/26/24, prior subacromial injection in 2018. Does not take PRNs pain medications at home.   -Pain control with Tylenol 650 mg TID PRN           Diet: Regular Diet Adult  Diet    DVT Prophylaxis: Heparin SQ  Ocampo Catheter: Not present  Lines: None     Cardiac Monitoring: None  Code Status: Full Code        Disposition Plan     Medically Ready for Discharge: Anticipated in 2-4 Days      Georgia Stoner MD  Medicine Service, [facility] TEAM [address] [place] [address]  Securely message with [employer] (more info)  Text page via [employer] Paging/Directory   See signed in provider for up to date coverage information  ______________________________________________________________________    Interval History   No acute events overnight, afebrile. Endorses headache in band distribution around head, requesting tylenol which primary said could schedule TID and patient agreeable. Also endorsed nausea, which is not persistent or positional. Gave one time compazine. Does not endorse focal neuro deficits. Patient reporting ongoing feeling short of breath, lyly w. Exertion reporting increasing O2 up to 4L NC with any activity. Endorses a \"coughing spell\" overnight, which lasted several minutes. She saw a desat on the monitor. Ongoing small amounts of blood in sputum, mainly streaks and does not feel it is increased from yesterday.      Physical Exam   Vital Signs: Temp: 98.7  F (37.1  C) Temp src: Oral BP: 132/85 " Pulse: 80   Resp: 18 SpO2: 97 % O2 Device: Nasal cannula Oxygen Delivery: 2 LPM  Weight: 129 lbs 6.4 oz    General: alert & oriented, no acute distress as sitting up in bed, MMM, EOMI w/o scleral icterus, neck FROM, fluent speech, very pleasant and conversational   Resp: No increased WOB at rest, no upper airway stridor, diffuse loud inspiratory and expiratory wheezing with some course crackles in LLL and left middle lung field, on NC  Cards: RRR w/o murmurs/rubs/gallops, trace LE edema  GI: soft, nontender, nondistended, +BS  Skin: warm and well perfused, no appreciated rashes, 2 small bruise-like area on R.LE  Neuro/MSK: CN2-12i, moving all 4 extremities equally    Medical Decision Making       50 MINUTES SPENT BY ME on the date of service doing chart review, history, exam, documentation & further activities per the note.  MANAGEMENT DISCUSSED with the following over the past 24 hours: Pulmonary and Rheumatology, ID       Data   ------------------------- PAST 24 HR DATA REVIEWED -----------------------------------------------    I have personally reviewed the following data over the past 24 hrs:    N/A  \   N/A   / N/A     N/A N/A N/A /  N/A   N/A N/A N/A \       Imaging results reviewed over the past 24 hrs:   No results found for this or any previous visit (from the past 24 hour(s)).

## 2024-07-22 NOTE — PROGRESS NOTES
Patient has been assessed for Home Oxygen needs. Oxygen readings:    *Pulse oximetry (SpO2) = 90% on room air at rest while awake.    *SpO2 improved to 96% on 2 liters/minute at rest.    *SpO2 = 80% on room air during activity/with exercise.    *SpO2 improved to 93% on 4 liters/minute during activity/with exercise.

## 2024-07-22 NOTE — PLAN OF CARE
Goal Outcome Evaluation:      Plan of Care Reviewed With: patient    Overall Patient Progress: no changeOverall Patient Progress: no change    Outcome Evaluation: A&O, vitally stable. O2 study last night; pt was on 2L NC and satted in the upper-90's. Denies pain. Continue POC.

## 2024-07-22 NOTE — PLAN OF CARE
Author assumed pt's care from [id number]. VSS on 2 L NC with up to 4L needed with activity. 6 minute walk test done. Pt aware of provider orders for overnight pulse ox study tonight. Pt up SBA in room. Appetite is fair, denies any nausea or vomiting. Tylenol given for headache pain. Left hand PIV saline locked. Pt's  Dagoberto is present and supportive at the bedside. Will continue pt's plan of care.

## 2024-07-23 ENCOUNTER — APPOINTMENT (OUTPATIENT)
Dept: GENERAL RADIOLOGY | Facility: CLINIC | Age: 64
End: 2024-07-23
Payer: COMMERCIAL

## 2024-07-23 ENCOUNTER — CARE COORDINATION (OUTPATIENT)
Dept: PULMONOLOGY | Facility: CLINIC | Age: 64
End: 2024-07-23

## 2024-07-23 VITALS
WEIGHT: 129.4 LBS | BODY MASS INDEX: 23.81 KG/M2 | TEMPERATURE: 98.6 F | SYSTOLIC BLOOD PRESSURE: 124 MMHG | OXYGEN SATURATION: 97 % | RESPIRATION RATE: 16 BRPM | HEIGHT: 62 IN | HEART RATE: 84 BPM | DIASTOLIC BLOOD PRESSURE: 83 MMHG

## 2024-07-23 LAB
CRP SERPL-MCNC: 161 MG/L
MYELOPEROXIDASE AB SER IA-ACNC: 1.3 U/ML
MYELOPEROXIDASE AB SER IA-ACNC: NEGATIVE

## 2024-07-23 PROCEDURE — 250N000013 HC RX MED GY IP 250 OP 250 PS 637

## 2024-07-23 PROCEDURE — 86140 C-REACTIVE PROTEIN: CPT | Performed by: INTERNAL MEDICINE

## 2024-07-23 PROCEDURE — 71045 X-RAY EXAM CHEST 1 VIEW: CPT

## 2024-07-23 PROCEDURE — 99239 HOSP IP/OBS DSCHRG MGMT >30: CPT | Mod: GC | Performed by: INTERNAL MEDICINE

## 2024-07-23 PROCEDURE — 36415 COLL VENOUS BLD VENIPUNCTURE: CPT | Performed by: INTERNAL MEDICINE

## 2024-07-23 PROCEDURE — 250N000012 HC RX MED GY IP 250 OP 636 PS 637: Performed by: INTERNAL MEDICINE

## 2024-07-23 PROCEDURE — 71045 X-RAY EXAM CHEST 1 VIEW: CPT | Mod: 26 | Performed by: RADIOLOGY

## 2024-07-23 PROCEDURE — 250N000013 HC RX MED GY IP 250 OP 250 PS 637: Performed by: INTERNAL MEDICINE

## 2024-07-23 RX ORDER — PREDNISONE 20 MG/1
40 TABLET ORAL DAILY
Qty: 2 TABLET | Refills: 0 | Status: SHIPPED | OUTPATIENT
Start: 2024-07-23

## 2024-07-23 RX ADMIN — VALACYCLOVIR HYDROCHLORIDE 500 MG: 500 TABLET, FILM COATED ORAL at 09:25

## 2024-07-23 RX ADMIN — PREDNISONE 40 MG: 20 TABLET ORAL at 09:26

## 2024-07-23 RX ADMIN — FOLIC ACID 1 MG: 1 TABLET ORAL at 09:26

## 2024-07-23 RX ADMIN — ACETAMINOPHEN 975 MG: 325 TABLET, FILM COATED ORAL at 14:36

## 2024-07-23 RX ADMIN — LEVOTHYROXINE SODIUM 112 MCG: 0.11 TABLET ORAL at 09:26

## 2024-07-23 RX ADMIN — ACETAMINOPHEN 975 MG: 325 TABLET, FILM COATED ORAL at 10:26

## 2024-07-23 RX ADMIN — PANTOPRAZOLE SODIUM 40 MG: 40 TABLET, DELAYED RELEASE ORAL at 09:25

## 2024-07-23 RX ADMIN — ACETAMINOPHEN 650 MG: 325 TABLET, FILM COATED ORAL at 05:11

## 2024-07-23 ASSESSMENT — ACTIVITIES OF DAILY LIVING (ADL)
ADLS_ACUITY_SCORE: 24

## 2024-07-23 NOTE — PLAN OF CARE
Goal Outcome Evaluation:           Overall Patient Progress: improvingOverall Patient Progress: improving         Cares from: [id number]    V/S & pain: VSS on O2 @2L via  NC , headache pain managed w/ tylenol and prn compazine  Neuro: A/O x4, calm and cooperative   Respiratory: stable on O2 at 2L via NC, lung sounds with wheezing throughout, continuous pulse ox monitoring with sats in upper 90s  Skin: no new skin concerns present-  GI/: voiding spontaneously, last BM 7/22  Nutrition: reg  diet w/ good appetite and adequate po intake, denies N/V   Lines/drains: left PIV SL  Activity: independent    Events this shift: No acute events this shift. Pt walked around the unit in the evening using only O2 @ 2L, and reported O2 Sat 92% after activity. PRN Tylenol given for headache this AM.Has received O2 tank for transportation home.  Slept through the night. Call light w/in reach and able to make needs known/    Plan: continue w/ poc, possible discharge in the AM

## 2024-07-23 NOTE — PROGRESS NOTES
Care Management Discharge Note    Discharge Date: 07/23/2024     Discharge Disposition: Home    Discharge Services:  NA    Discharge DME: Oxygen    Discharge Transportation: family or friend will provide    Private pay costs discussed: Not applicable    Does the patient's insurance plan have a 3 day qualifying hospital stay waiver?  No    PAS Confirmation Code: NA  Patient/family educated on Medicare website which has current facility and service quality ratings: no    Education Provided on the Discharge Plan: Yes  Persons Notified of Discharge Plans: pt, nursing, provider  Patient/Family in Agreement with the Plan: yes    Handoff Referral Completed: Yes, EHO    Additional Information:    Pt is med ready for discharge home today. [facility] provided the portable tank for transport home. Writer faxed the DME O2 order in discharge order and walk test  to [facility]. Writer called [facility] to confirm the discharge. They received the fax and will follow up with the pt. Writer informed the pt. EHO completed. Pt's  to transport pt home.    Discharge Resource:  [place], [place]  P: [phone]  F: [phone]    Marilynn Walters RN    7C RN Coordinator  Phone: [phone]  7/23/2024  Units: 7C Med Surg 7401 thru 7418 RNCC     Social Work and Care Management Department   SEARCHABLE in [facility] - search CARE COORDINATOR   [place] [facility] ([id number]) Saturday & Sunday; ([phone]) [facility] Recognized Holidays   Units: 5A Onc 5201 - 5219 RNCC,  5A Onc 5220 thru 5240 RNCC, 5C OFFSERVICE [phone] RNCC & 5C OFF SERVICE [phone] RNCC   Units: 6B Vocera, 6C Card 6401 thru 6420 RNCC, 6C Card 6502 thru 6514 RNCC & 6C Card 6515 thru 6519 RNCC    Units: 7A SOT RNCC Vocera, 7B Med Surg Vocera, & 7C Med Surg 7502 thru 7521 RNCC   Units: 6A Vocera & 4A CVICU Vocera, 4C MICU Vocera, and 4E SICU Vocera     Units: 5 Ortho Vocera & 5 Med Surg Vocera    Units: 6 Med Surg Vocera & 8 Med Surg Vocera

## 2024-07-23 NOTE — DISCHARGE SUMMARY
[facility]  Discharge Summary - Medicine & Pediatrics       Date of Admission:  7/19/2024  Date of Discharge:  7/23/2024  Discharging Provider: Temitope Alex  Discharge Service: Medicine ServiceTHOMAS TEAM 1    Discharge Diagnoses   # Acute Hypoxic Respiratory Failure s/p rigid bronchosopy (7/19/2024) suspect 2/2 mucosal swelling and intraprocedure bleeding, slow improvement  # Worsening SOB and Dyspnea on exertion (prior to admission)  # Chronic  Nocturnal Hypoxia, baseline 2L NC  # ANCA vasculitis, bronchial inflammation, MPO (+) in 1/2024   # Acute on Chronic Mild Hemoptysis in setting of Pulmonary Vasculitis and recent bronchoscopy   # Immunosuppression on Cytoxan (6/24, 7/8)  # Elevated CRP  # +RhinoVirus Viral Swab (7/19)  #Tension Headache  # Normocytic Anemia (stable)  # Acute on Chronic Mild Hemoptysis in setting of Pulmonary Vasculitis and recent bronchoscopy   # Low-Normal Folate Level (7/21)  #Hypothyroidism  #GERD  #Ulcerative Colitis, not on systemic therapy  #Atrial fibrillation s/p ablation in 2017  #Migraines w/o aura (last one in 2017)  #Impingement Syndrome, right shoulder    Clinically Significant Risk Factors          Follow-ups Needed After Discharge   Follow-up Appointments     Adult [facility] Follow-up and recommended labs and tests      Follow up with primary care provider, Sunita Dominguez, within 7 days for   hospital follow- up.      You will also have follow up with both pulmonology and rheumatology within   the next 1-3 weeks for hospital follow up.      Appointments on [place] and/or [facility] (with [facility] or [facility]   provider or service). Call [phone] if you haven't heard regarding   these appointments within 7 days of discharge.          Unresulted Labs Ordered in the Past 30 Days of this Admission       Date and Time Order Name Status Description    7/19/2024  9:32 AM Fungal or Yeast Culture Routine Preliminary     7/19/2024   9:32 AM Acid-Fast Bacilli Culture and Stain In process         These results will be followed up by PCP    Discharge Disposition   Discharged to home  Condition at discharge: Stable    Hospital Course   Rose Krishna is a 64 year old female w/ PMHx of ANCA vasculitis with granulomas on biopsy (3/13/24) currently receiving Cytoxan infusions and Tavenous PO, hypothyroidism, ulcerative colitis, atrial fibrillation s/p ablation in 2017  who has been having issues w/ worsening shortness of breath, BREEN, wheezing, stridor who underwent ridid bronchoscopy w/ Dr. Dixon on 7/19 and post-procedure developed acute hypoxic respiratory failure for which patient was admitted to [facility] team for further management. IP Pulmonary, Rheumatology, and ID consulted for further workup.  Viral Respiratory swab positive for Rhinovirus. Patient continues to require oxygen support but has been able to wean. At time of discharge patient was requiring 1L NC and was symptomatically improved and imaging showed increased aeration. She was set up from home oxygen at discharge.  Patient and patient's family felt comfortable discharging home with close follow-up with both primary care and her pulmonologist and rheumatology.      # Acute Hypoxic Respiratory Failure s/p rigid bronchosopy (7/19/2024) suspect 2/2 mucosal swelling and intraprocedure bleeding, slow improvement  # Worsening SOB and Dyspnea on exertion (prior to admission)  # Chronic Nocturnal Hypoxia, baseline 2L NC  # ANCA vasculitis, bronchial inflammation, MPO (+) in 1/2024   # Acute on Chronic Mild Hemoptysis in setting of Pulmonary Vasculitis and recent bronchoscopy   # Immunosuppression on Cytoxan (6/24, 7/8)  # Elevated CRP  # +RhinoVirus Viral Swab (7/19)  Patient follows with rheumatology in [place]. Initially presented with worsening dyspnea in late 2022, MPO positive ANCA vasculitis. Rheum started high dose steroids with minimal response in Jan 2024. Biopsy 3/2024  granulomatous inflammation and s/p rituximab x4 doses w/ minimal response. CT imaging on 6/7/24 showed new cavitary lesions in lungs with supraglottic stenosis. Started on Avacopan. ENT evaluated with laryngoscope; no supraglottal obstruction. Referred to [facility] pulmonology for rigid bronchoscopy with possible balloon dilation. Started on 1st dose of Cytoxan on 6/24 2024 and 2nd dose on 7/8/2024. No renal involvement (gets weekly Uas), has intermittent proteinuria, no h/o of hematuria. Recent MRI/MRA HEAD without large vessel vasculitis, has chronic small vessel ischemic changes likely 2/2 vasculitis. No neuro deficits. Echocardiogram on 7/5/24 EF 65%, no known coronary aa involvement but needs stress test.  Post-bronchoscopy on 7/19 developed acute hypoxic respiratory failure, admitted to [facility] team for further management. CXR from 7/19 with RLL opacities consistent with recent BAL and no focal signs on pneumonia on pulmonary exam, improved on CXR 7/21. COVID negative. Viral swab + Rhino/Enterovirus.  on admission.  At time of discharge patient had been able to wean down to 1 L nasal cannula.  She underwent a 6-minute walk test demonstrating desaturation with exercise, she was sent home with home oxygen.  - on NC, wean as tolerated to baseline (baseline daytime RA to 1L, nighttime 2L NC)  - 6 minute walk test and overnight oxymetry completed, patient qualifies for home O2  - Interventional Pulmonary Consulted              - s/p Rigid bronchoscopy w/ tissue debulking balloon dilation, bronchus steroid injection and bronchial biopsy. Findings of diffusely erythematous airways (R>L) and some friable mucosa w/ bleeding s/p dilation and biopsy               - Bronch Studies: Culture normal bailey, KOH w/o fungal components, AFB pending, fungal culture pending, Surg Path pending  - Rheumatology consulted              - Rheum Labs: MPO 1.3, ANCA IgG negative,    - ID consulted: start PJP ppx qMWF  - Hold PTA  Tavenous 30mg BID, plan for Cytoxan Infusion 7/22. Can restart on 7/29; patient to follow up with her rheumatologist  - ppx: Continue PTA Valtrex 500mg BID, start Bactrim DS qMWF  - Pulm Toilet: Albuterol prn, Inspiratory spirometer   - Started on prednisone 40mg x 5 days course, will complete 2 additional days as an outpatient   [ ] Will need close PCP, Pulmonary and Rheumatology follow up.      #Tension Headache, improved  -Band like distribution associated with neck pain and coughing. Likely tension headache. No red flag symptoms. Nonfocal neurologic exam  -975mg TID Acetaminophen SCHEDULED  -Compazine once, can give PRN      # Normocytic Anemia (stable)  # Acute on Chronic Mild Hemoptysis in setting of Pulmonary Vasculitis and recent bronchoscopy   # Low-Normal Folate Level (7/21)  Iron studies consistent w/ anemia of chronic disease. Folate low normal, start supplementation. B12 wnl.   - start folate supplement     #Hypothyroidism  Extensive family history of hypothyroidism, no known Hashimoto's thyroiditis. Recent history of elevated TSH and increased Synthroid from 100 mcg to 112 mcg every day  -Continue PTA Synthroid     #GERD  -Continue PTA PPI 40 mg every day     #Ulcerative Colitis, not on systemic therapy  -Diagnosed in 1985 and symptoms consisted of 7 bloody diarrhea daily for several days at time.At the time, limited colonoscopy was performed with pathology concerning for UC. Initiated on sulfasalazine and prednisone; marked symptom improvement and was able to taper off of prednisone and discontinue sulfasalazine. Has had no additional UC treatment since the 1980s. Has approximately 3 stools daily which are loose but formed. Denies hematochezia recently but, on chart review, has intermittent hematochezia with blood mixed into stool. Evaluated for IBD-related chronic bronchitis in January 2024. Does not follow with gastroenterology as an outpatient and is not currently on a pharmacologic regimen to  control disease. Denies hematochezia and endorses 2-3 mushy bowel movements/day.      #Atrial fibrillation s/p ablation in 2017  No documented atrial fibrillation since ablation     #Migraines w/o aura (last one in 2017)     #Impingement Syndrome, right shoulder  Last subacromial injection in ortho clinic on 2/26/24, prior subacromial injection in 2018. Does not take PRNs pain medications at home.   -Pain control with Tylenol 650 mg TID PRN       Consultations This Hospital Stay   PULMONARY GENERAL ADULT IP CONSULT  RHEUMATOLOGY IP CONSULT  PHYSICAL THERAPY ADULT IP CONSULT  OCCUPATIONAL THERAPY ADULT IP CONSULT  INTERVENTIONAL PULMONARY ADULT IP CONSULT  INFECTIOUS DISEASE GENERAL ADULT IP CONSULT  CARE MANAGEMENT / SOCIAL WORK IP CONSULT    Code Status   Full Code       The patient was discussed with Dr. Temitope Newman MD  [facility] 7C MED SURG  [address]  [place] [address]  Phone: [phone]  ______________________________________________________________________    Physical Exam   Vital Signs: Temp: 98.6  F (37  C) Temp src: Oral BP: 124/83 Pulse: 84   Resp: 16 SpO2: 97 % O2 Device: None (Room air) Oxygen Delivery: 1 LPM  Weight: 129 lbs 6.4 oz  General: alert & oriented, no acute distress as sitting up in bed, MMM, EOMI w/o scleral icterus, neck FROM, fluent speech, very pleasant and conversational   Resp: No increased WOB at rest, no upper airway stridor, minimal inspiratory and expiratory wheezing with some course crackles in LLL and left middle lung field (significant improvement from prior), on 1L NC  Cards: RRR w/o murmurs/rubs/gallops, trace LE edema  GI: soft, nontender, nondistended, +BS  Skin: warm and well perfused, no appreciated rashes, 2 small bruise-like area on R.LE  Neuro/MSK: CN2-12i, moving all 4 extremities equally     Primary Care Physician   Sunita Dominguez    Discharge Orders      Adult Rheumatology  Referral      Adult  Pulmonary Medicine  Referral      Activity    Your activity upon discharge: activity as tolerated     Adult [facility] Follow-up and recommended labs and tests    Follow up with primary care provider, Sunita Dominguez, within 7 days for hospital follow- up.      You will also have follow up with both pulmonology and rheumatology within the next 1-3 weeks for hospital follow up.      Appointments on [place] and/or [facility] (with [facility] or [facility] provider or service). Call [phone] if you haven't heard regarding these appointments within 7 days of discharge.     Incentive Spirometry    Continue to use incentive spirometer 8 times a day Until return to normal activity     Reason for your hospital stay    You were hospitalized after your bronchoscopy due to shortness of breath and hypoxia requiring supplemental oxygen. While you were here you were evaluated by rheumatology and pulmonology. You were found to have a viral infection which may have contributed to your hypoxia, in addition to your known vasculitis as well as bronchoscopy procedure. We have started you on a short steroid course which you will take for another 2 days to finish a 5 day course. You will also have an inhaler to take as needed for shortness of breath. We expect you to slowly improve but have given you oxygen to go home with.     You will want to hold your Tavneo until you see your rheumatologist. You will also need to discuss with them when to restart your cyclophosphamide infusion. Please start taking bactrim three times a week to prevent other respiratory infections.    You will have follow up with both pulmonology and rheumatology within the next 1-3 weeks, they will call you to schedule.     Oxygen Adult/Peds    Oxygen Documentation  I certify that this patient, Rose Krishna has been under my care (or a nurse practitioner or physican's assistant working with me). This is the face-to-face encounter for oxygen medical  necessity.      At the time of this encounter, I have reviewed the qualifying testing and have determined that supplemental oxygen is reasonable and necessary and is expected to improve the patient's condition in a home setting.         Patient has continued oxygen desaturation due to Acute Respiratory Failure J96.01.    If portability is ordered, is the patient mobile within the home? yes    Was this visit performed as a telehealth visit: No    Patient has been assessed for Home Oxygen needs. Oxygen readings:     *Pulse oximetry (SpO2) = 90% on room air at rest while awake.     *SpO2 improved to 96% on 2 liters/minute at rest.     *SpO2 = 80% on room air during activity/with exercise.     *SpO2 improved to 93% on 4 liters/minute during activity/with exercise.     Diet    Follow this diet upon discharge: Regular     PRIMARY CARE FOLLOW-UP SCHEDULING    Please see details below            Significant Results and Procedures   Most Recent 3 CBC's:  Recent Labs   Lab Test 07/21/24  0538 07/20/24  0555 06/06/24  1248   WBC 10.9 10.7 13.4*   HGB 10.5* 10.6* 14.0   MCV 95 95 91    362 338     Most Recent 3 BMP's:  Recent Labs   Lab Test 07/20/24  0555 06/06/24  1248 03/21/24  0848    139 142   POTASSIUM 3.9 4.0 3.3*   CHLORIDE 107 100 102   CO2 24 26 27   BUN 16.9 19.0 20.0   CR 0.47* 0.74 0.78   ANIONGAP 10 13 13   RANDA 8.8 9.1 9.3   * 110* 86     Most Recent 2 LFT's:  Recent Labs   Lab Test 06/06/24  1248 03/21/24  0848   AST 14 11   ALT 15 8   ALKPHOS 52 60   BILITOTAL 0.3 0.5     Most Recent 3 INR's:No lab results found.  Most Recent 3 Troponin's:No lab results found.  Most Recent 3 BNP's:No lab results found.  Most Recent D-dimer:No lab results found.,   Results for orders placed or performed during the hospital encounter of 07/19/24   XR Surgery BHARGAV L/T 5 Min Fluoro w Stills    Narrative    This exam was marked as non-reportable because it will not be read by a   radiologist or a [facility]  non-radiologist provider.         XR Chest Port 1 View    Narrative    EXAM: XR CHEST PORT 1 VIEW  7/19/2024 10:09 AM      HISTORY: post dilation    COMPARISON: Chest CT 6/6/2024    FINDINGS: Single portable AP view of the chest. Slight rightward  deviation of the trachea. Cardiac silhouette is not enlarged. There  are mixed airspace and interstitial opacities in the right lower lung  fields. Left lung is clear. Costophrenic angles are clear. No  pneumothorax.      Impression    IMPRESSION:   1. Right lower lobe opacities, most likely consistent with fluid from  bronchoscopy/BAL    I have personally reviewed the examination and initial interpretation  and I agree with the findings.    REJI TELLEZ MD         SYSTEM ID:  [id number]   XR Chest Port 1 View    Narrative    Exam: XR CHEST PORT 1 VIEW, 7/21/2024 10:09 AM    Indication: repeat CXR given prolonged hypoxia    Comparison: Chest radiograph 7/19/2024.    Findings:   Portable chest. Trachea is more midline with less rightward deviation  compared to prior. Cardiomediastinal contours are within normal  limits. Significant interval improvement of mixed airspace and  interstitial opacities in the right lower lung compared to prior  study. Left thorax remains clear. No significant effusion. No  pneumothorax. No acute osseous abnormality. Upper abdomen  unremarkable.      Impression    Impression: Significant interval improvement of airspace and  interstitial opacities in the right lower lobe compared to prior  study.    KARLA WALDEN MD         SYSTEM ID:  [id number]       Discharge Medications   Current Discharge Medication List        START taking these medications    Details   albuterol (PROAIR HFA/PROVENTIL HFA/VENTOLIN HFA) 108 (90 Base) MCG/ACT inhaler Inhale 2 puffs into the lungs every 4 hours as needed for shortness of breath, wheezing or cough  Qty: 18 g, Refills: 0    Comments: Pharmacy may dispense brand covered by insurance (Proair, or proventil or  ventolin or generic albuterol inhaler)  Associated Diagnoses: Acute on chronic respiratory failure with hypoxia (H)      folic acid (FOLVITE) 1 MG tablet Take 1 tablet (1 mg) by mouth daily for 30 days  Qty: 30 tablet, Refills: 0    Associated Diagnoses: Folate deficiency      predniSONE (DELTASONE) 20 MG tablet Take 2 tablets (40 mg) by mouth daily  Qty: 2 tablet, Refills: 0    Associated Diagnoses: Acute on chronic respiratory failure with hypoxia (H)      sulfamethoxazole-trimethoprim (BACTRIM DS) 800-160 MG tablet Take 1 tablet by mouth three times a week for 30 days  Qty: 12 tablet, Refills: 0    Associated Diagnoses: Acute on chronic respiratory failure with hypoxia (H); Vasculitis (H24)           CONTINUE these medications which have CHANGED    Details   fluticasone (FLONASE) 50 MCG/ACT nasal spray Spray 1-2 sprays into both nostrils daily as needed for rhinitis  Qty: 9.9 mL, Refills: 0    Associated Diagnoses: Acute on chronic respiratory failure with hypoxia (H)           CONTINUE these medications which have NOT CHANGED    Details   estradiol (VIVELLE-DOT) 0.0375 MG/24HR BIW patch Place 1 patch onto the skin twice a week on Mondays and Thursdays      levothyroxine (SYNTHROID/LEVOTHROID) 112 MCG tablet Take 112 mcg by mouth daily      mesna (MESNEX) 400 MG TABS tablet Take 400 mg by mouth twice daily on infusion days      omeprazole (PRILOSEC) 20 MG DR capsule Take 20 mg by mouth daily      valACYclovir (VALTREX) 500 MG tablet Take 500 mg by mouth 2 times daily           STOP taking these medications       aerochamber plus with mask - large/blue/>5 years Comments:   Reason for Stopping:         TAVNEOS 10 MG CAPS Comments:   Reason for Stopping:             Allergies   Allergies   Allergen Reactions    Sumatriptan Other (See Comments), Shortness Of Breath, Swelling and Anaphylaxis     Throat

## 2024-07-24 ENCOUNTER — TELEPHONE (OUTPATIENT)
Dept: PULMONOLOGY | Facility: CLINIC | Age: 64
End: 2024-07-24
Payer: COMMERCIAL

## 2024-07-24 NOTE — PROGRESS NOTES
Surg path results forwarded to Dr. Cole. Requesting he update patient on results/next steps.     Will await direction on orders/follow-up plan (if any).      Surgical Pathology Exam: [id number]  Order: [id number]  Collected 7/19/2024  9:29 AM       Status: Final result       Visible to patient: Yes (seen)    0 Result Notes       Component  Resulting Agency   Case Report   Surgical Pathology Report                         Case: [id number]                                   Authorizing Provider:  Kemal Dixon MD       Collected:           07/19/2024 09:29 AM           Ordering Location:      MAIN OR                 Received:            07/19/2024 09:54 AM           Pathologist:           Marcy Delgado MD                                                                           Specimen:    Lung, Right, Right bronchial biopsy                                                        Final Diagnosis   A. LUNG, RIGHT BRONCHIAL BIOPSY:  - Fragments of granulation tissue with hemorrhage, fibrinoid necrosis, capillaritis and neutrophilic-histiocytic infiltrate   - See comment

## 2024-07-24 NOTE — PLAN OF CARE
"Nursing Care Plan Note:    Assumed care 0700 to discharge 1445    /83 (BP Location: Right arm)   Pulse 84   Temp 98.6  F (37  C) (Oral)   Resp 16   Ht 1.575 m (5' 2\")   Wt 58.7 kg (129 lb 6.4 oz)   SpO2 97%   BMI 23.67 kg/m      Active/Admitting Problems:  increased oxygen needs   Pt rounded on hourly  Froylan:  alert and oriented   Pain:  headache tylenol given   GI/:  Denies nausea. Bowel sounds present. Good urine output clear yellow urine  How Pt Takes Meds:  oral  Cardiac:  WDL  Respiratory:  SOB with exertion. Pt on 1 L NC at rest 3 L NC with activity. Pt has wheezing in bilateral breath sounds. Pt has cough with bloody sputum, but blood is decreasing   Skin:  clean dry and intact  Lines:  PIV removed   Activity/mobility:  I  Events:  discharged at 1445    Goal Outcome Evaluation:     Plan of Care Reviewed With: Pt     Overall Patient Progress: met     Outcome Evaluation:pt discharged    Discharge to: home  Transportation: pts   Time: 1445  Prescriptions: pt will  at home pharmacy   Belongings: sent with pt   Access: removed   Care plan and education discontinued: yes   Paperwork:  sent with patient. Care coordinator faxed new home oxygen needs t pts current oxygen company         "

## 2024-07-25 NOTE — TELEPHONE ENCOUNTER
Bronch results all forwarded to patient's Rheumatologist (Dr. Kandace Ross) at [facility] in [place], [place] per Dr. Cole's request.

## 2024-08-08 ENCOUNTER — TELEPHONE (OUTPATIENT)
Dept: PULMONOLOGY | Facility: CLINIC | Age: 64
End: 2024-08-08
Payer: COMMERCIAL

## 2024-08-08 ENCOUNTER — REFERRAL (OUTPATIENT)
Dept: TRANSPLANT | Facility: CLINIC | Age: 64
End: 2024-08-08

## 2024-08-08 DIAGNOSIS — J84.9 ILD (INTERSTITIAL LUNG DISEASE) (H): ICD-10-CM

## 2024-08-08 DIAGNOSIS — I77.82 ANCA-ASSOCIATED VASCULITIS (H): ICD-10-CM

## 2024-08-08 DIAGNOSIS — Z01.818 ENCOUNTER FOR PRE-TRANSPLANT EVALUATION FOR LUNG TRANSPLANT: Primary | ICD-10-CM

## 2024-08-08 NOTE — TELEPHONE ENCOUNTER
Patient contacted back. Current symptoms, no fever, cough not getting better, productive cough with green sputum. Bactrim since discharged from [facility]. Levaquin started on Monday.     Patient is on cytoxan, has received 3 does 2 weeks apart, last dose was on 7/29, they are working on changing to every other week infusions. Restarted Tavneos (avacopan) yesterday.     Message sent to Dr Bailey.

## 2024-08-08 NOTE — Clinical Note
Referral closed, seeking care 2nd opinion for vasculitis at [place]. No follow up with ILD of SOT since 10/14/2024

## 2024-08-08 NOTE — LETTER
Rose Krishna  [address] [place] [address]    Dear Rose,    Thank you for your interest in the Transplant Center at [facility]. We look forward to being a part of your care team and assisting you through the transplant process.    As we discussed, your transplant coordinator is Rika Martinez (Lung).  You may call your coordinator at any time with questions or concerns.  Your first scheduled call will be on 9/04/2024 between 10am-12pm..  If this needs to change, call [phone].    Please complete the following.    Fill out and return the enclosed forms  Authorization for Electronic Communication  Authorization to Discuss Protected Health Information  Authorization for Release of Protected Health Information    Sign up for:  [employer], access to your electronic medical record (see enclosed pamphlet)  ChartCubetransplantCuil, a transplant education website       My Transplant Place   You can use these tools to learn more about your transplant, communicate with your care team, and track your medical details    Sincerely,  Solid Organ Transplant  [facility]    cc: Referring Physician PCP

## 2024-08-08 NOTE — TELEPHONE ENCOUNTER
----- Message from Jorge Bailey sent at 8/8/2024  9:52 AM CDT -----  Regarding: RE: Transplant for ANCA airway disease?  I am copying ILD RN pool rather than sarcoid. ILD RN, can you kindly call the patient for symptom check (I hear from local pulm that she is on 2 LPM and having excessive cough), confirm she started IV cytoxan by local rheum or when is she starting, and update her that we would like to see our lung transplant clinic for an introductory visit as we might be starting to run out of options to control her disease.     AK  ----- Message -----  From: Serafin Wu MD  Sent: 8/8/2024   9:33 AM CDT  To: Jorge Bailey MD; Rika Martinez RN; #  Subject: RE: Transplant for ANCA airway disease?          [facility], thanks a lot. I rememeber her. Inna had called me about her a while ago and we wanted to see if we could control her disease with imunosuppression and IP. Team-please open a referral for her. I would see her in clinic with an updated PFT, 6MWT. ILD team-can you ensure patient is aware of transplant referral?  ----- Message -----  From: Jorge Bailey MD  Sent: 8/8/2024   8:04 AM CDT  To: Serafin Wu MD  Subject: Transplant for ANCA airway disease?              Yancy,   I have this challenging case of MPO p-ANCA vasculitis, manifesting with severe obstructive disease and airway ulceration with segmental stenosis s/p IP attempt to dilate and infiltrate local injection, complicated with hypoxemic respiratory failure for a few days and was discharged. She is not responding to immunosuppression (RTX completed in 5/2024 with zero B cells, po CYC and now transitioned to IV CYC, and did receive a lot of po steroids). She is a lifetime nonsmoker with minimal co-morbidities and wonder if it might be reasonable to consider a transplant evaluation, she lives in [place].     [facility]   Pt son Eyad Triplett picking up script

## 2024-08-12 VITALS — BODY MASS INDEX: 23.55 KG/M2 | HEIGHT: 62 IN | WEIGHT: 128 LBS

## 2024-08-12 LAB
PATH REPORT.ADDENDUM SPEC: NORMAL
PATH REPORT.COMMENTS IMP SPEC: NORMAL
PATH REPORT.FINAL DX SPEC: NORMAL
PATH REPORT.GROSS SPEC: NORMAL
PATH REPORT.MICROSCOPIC SPEC OTHER STN: NORMAL
PATH REPORT.RELEVANT HX SPEC: NORMAL
PHOTO IMAGE: NORMAL

## 2024-08-12 PROCEDURE — 88342 IMHCHEM/IMCYTCHM 1ST ANTB: CPT | Mod: 26 | Performed by: STUDENT IN AN ORGANIZED HEALTH CARE EDUCATION/TRAINING PROGRAM

## 2024-08-12 PROCEDURE — 88342 IMHCHEM/IMCYTCHM 1ST ANTB: CPT | Mod: TC | Performed by: INTERNAL MEDICINE

## 2024-08-12 NOTE — TELEPHONE ENCOUNTER
SOT LUNG INTAKE  August 12, 2024 4:14 PM - Azra Zepeda LPN:     Intake completed with pt     Ivan    Referring Provider: Jorge Bailey   Primary Care Provider: Jesus Dominguez MD  Specialist: Pulm: Jorge Bailey MD, Pulm disease: Deepak Cole   Source/Facility:[facility]   Diagnosis: I    Critical History     Previous transplants: No  Smoking/nicotine use history: Never   Alcohol use history: Not currently   Drug use history: No   Cancer history: No  Cardiac history: No  Diabetes: No  Biopsy Most recent BRONCHOSCOPY, RIGID, tissue debulking balloon dilation,;  Surgeon: Kemal Dixon MD;  Location: [facility] OR   Oxygen use at rest: 2L with activity: 2L    BMI: 23.41    Comments    Is patient in a group home/assisted living? No  Does patient have a guardian? No    Referral intake process completed.  Patient is aware that after financial approval is received, medical records will be requested.   Patient confirmed for a callback from transplant coordinator on 9/04/2024. (within 2 weeks)      Patient verbal consent given to access medical records and documents outside of [facility] through [facility]. Yes    Confirmed coordinator will discuss evaluation process in more detail at the time of their call.   Patient is aware of the need to arrange age appropriate cancer screening, vaccinations, and dental care.  Reminded patient to complete questionnaire, complete medical records release, and review packet prior to evaluation visit .  Assessed patient for special needs (ie--wheelchair, assistance, guardian, and ):  Oxygen 2L HS   Patient instructed to call [phone] with questions.

## 2024-08-14 NOTE — PROGRESS NOTES
[facility]    Reason for Visit  Rose Krishna is a 64 year old year old female who is being seen for Interstitial Lung Disease (ILD) (ILD follow up )  HPI  Patient is 64 years old female with no previous pulm history prior to the year 2021, remote diagnosis of ulcerative colitis 1985 post sulfa and prednisone at the time, never needing biologics and mainly self-limited over the last several years. She first developed symptoms of chronic cough in December 2021.  Her cough was dry at the time, but gradually became productive, coughing up up to half-1 cough of greenish frothy sputum every day.  In early 2022 she received Tessalon, followed albuterol inhaler [worsened cough], amoxicillin, then azithromycin (for pneumonia on CT), prednisone bursts starting with 20 mg max dose, and eventually Breo in July 2022 with no improvement in the cough at any point.  She has never been on an ACE inhibitor.  Chest imaging suggested pneumonia in May 2022 by CT for which azithromycin was given.  Her labs showed no eosinophilia but her MPO antibody was positive.  She then underwent cardiac, ENT and GI workup for other causes of cough and all returned within normal.  Methacholine challenge test was performed in July 2023 (FEV1 dropped by 22% and was interpreted as positive) which suggested a new diagnosis of asthma.  She was then prescribed multiple inhalers with no improvement including budesonide and Breztri, this is specifically perplexing as she also does not respond to prednisone 20 mg.  Nebulizers also do not help. She saw [place] (Dr. Velazquez) and at the time her bronchitis was suspected to be an association of her IBD, and this was prior to her lung consolidation finding. Myeloperoxidase antibody and P-ANCA were positive on repeat testing.  CT chest imaging in January 2024 showed a right lower lobe consolidation and she underwent bronchoscopy with BAL, her BAL did not show infection and  "there was no eosinophils.  Right lower lobe demonstrated an ulcer which was biopsied, and we have the report from outside documenting granulomatous inflammation with negative AFB and fungal stains.  The airway exam was also significant for diffuse inflammation. She was started on prednisone at my direction 60 mg p.o. daily along with Bactrim on 3/16/24 when I received a phone call to discuss her case. She is a lifetime non-smoker and never had drug use.  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    Interval Hx 6/24:  Started on Rituximab on 4/12 along with 3 days of solumedrol infusion for increasing hemoptysis. Had tried to titrate down on prednisone multiple times unsuccessfully. Had some stridor per PCP, worsening cough/congestion on 5/24 so CT neck ordered. Saw rheumatology on 5/29 and returned to 60 mg Prednisone, started on Tavneous and a 10 day course of Augmentin.  Planned for repeat bronch prior to starting cytoxin.     Overall, patient feels like she \"got run over by a truck\". Did not note a difference in her symptoms with increase of prednisone or starting the Tavneos this week so far. She has been having a \"wetter\", more productive cough, constant, worse in am. Still having peach/pink sputum. Much more shortness of breath with shorter distances, stairs are difficult. Feels wheezy, audible stridor worse when lying flat in bed. Sputum has been green but Augmentin helped this. No fevers, chills, night sweats. Not on any inhalers or nebs since they do not help her. No leg swelling, chest pain, orthopnea. BP today 137/91, worsening BP which is new for her and she is concerned about this.     Interval history 8/15/24:  Rose returns in light of exacerbation of symptoms. She underwent rigid bronchoscopy, balloon dilation, endobronchial biopsy and steroid injection of RLL 7/19/24 complicated by acute hypoxic respiratory failure requiring 2-4 LPM at rest and several " "days of admission. She started feeling better after discharge, but then her cough recurred, and underwent a follow up CT chest locally with Dr Viera around 2 weeks ago, which showed new multisegmental consolidation of RLL that was concerning for necrotizing pneumonia vs ANCA vasculitis flare. She underwent repeat bronchoscopy with BAL by Dr Viera and I was able to review a video of this inspection, showing now involvement in the left lobes, and airway erythema and inflammation that is significant in the RLL, with patent segments. She denies any fever over the last 4 weeks. She is now on cyclophosphamide IV since June 2024 as a transition from RTX given suspected suboptimal response to RTX. She is also back on avacopan after 2 weeks interruption in late July 2024. She has intermittent hemoptysis still. Her voice is still hoarse but not significantly changed.     Vitals: /82   Pulse 73   Ht 1.575 m (5' 2\")   Wt 58.1 kg (128 lb)   SpO2 96%   BMI 23.41 kg/m      Exam:   GENERAL APPEARANCE: Well developed, well nourished, alert, and in no apparent distress.  RESP: good air flow throughout. Pan-inspiratory wheeze bilaterally, mainly in the RLL.  CV: Normal S1, S2, regular rhythm, normal rate. No murmur.  No LE edema.   MS: extremities normal. No clubbing. No cyanosis.  SKIN: no rash on limited exam.  NEURO: Mentation intact, speech normal, normal gait and stance.  PSYCH: mentation appears normal. and affect normal/bright.  Results:      PFTs 8/15/24:    My interpretation: moderate degree obstruction, preserved diffusion capacity corrected for Hb. Obstruction has progressed since 3/2024.         Chest imaging:    CT Chest 6/6/24:  IMPRESSION: Patchy groundglass opacities, bronchial wall thickening  and cavitary nodule right upper lobe compatible with ANCA vasculitis,  with groundglass opacities likely representing mild alveolar  hemorrhage likely accounting for hemoptysis.  Reviewed CT chest images from " today 8/15/24 and compared to 8/2/24, agreed with radiology report:  Narrative & Impression   EXAM: CT CHEST HI-RESOLUTION WO CONTRAST 8/15/2024 2:26 PM     HISTORY: 64 years Female ANCA-associated vasculitis (H); ILD  (interstitial lung disease) (H)     COMPARISON: CT chest 6/6/2024.     TECHNIQUE: Helical CT imaging of the chest was obtained without  contrast. Multiplanar post-processed and MIP reformats were obtained  reviewed. Inspiratory and expiratory images were obtained.     FINDINGS:        LINES/TUBES: None.        MEDIASTINUM: No suspicious lymphadenopathy. Borderline-enlarged  thoracic ascending aorta measuring 4.0 cm. Normal size of the  pulmonary trunk.. Normal heart size. No pericardial effusion.      LUNG: Mucous plugging within the right lower lobe segmental bronchus  (previously narrowed due to bronchial wall thickening  6/6/2024 with resultant confluent right lower lobe opacities, as well  as superimposed micronodular opacities throughout the right lower  lobe. Additional areas of mucous plugging in the right middle lobe and  left lower lobe. Groundglass opacities in the right lung apex,  increased compared to prior.. Expiratory images demonstrate mild air  trapping.. Decreased size of the subpleural right upper lobe nodule  measuring 4 mm (3/76), previously measuring 7 mm and demonstrating  central cavitation, which is not present on today's examination.  Additional 4 mm subpleural right upper lobe nodule (3/77), previously  4 mm..     PLEURA: No pneumothorax or pleural effusion.        LOWER NECK: Thyroid unremarkable.     UPPER ABDOMEN: Limited evaluation due to lack of contrast. Esophagus  appears unremarkable. Cholecystectomy.     BONES: No acute osseous abnormality. No suspicious bony lesions.     SOFT TISSUES: Postsurgical changes of bilateral breast augmentation..                                                                         IMPRESSION:   1.  Scattered mucus plugging, most  pronounced within the right lower  lobe with resultant right lower lobe consolidative opacities, likely  infectious, particularly given adjacent tree in bud nodularity, though  a degree of hemorrhagic consolidation related to vasculitis is a  consideration was well.  2.  Increased patchy groundglass opacities in the right upper lobe,  which may represent mild alveolar hemorrhage in the setting of  hemoptysis/ANCA vasculitis.  3.  Decreased size of the previously visualized cavitary right upper  lobe pulmonary nodule with resolution of the cavitary component. No  new cavitary nodules identified.     I have personally reviewed the examination and initial interpretation  and I agree with the findings.          Assessment and plan:   MPO ANCA-associated vasculitis, single organ, endobronchial inflammation seems to be refractory to rituximab, RLL stenosis s/p balloon dilation and steroid injection and severe chronic bronchitis, BVAS persistent score=3, new/worse points score=4  Rose is 64F with chronic productive cough starting in late 2021, hemoptysis most of 2023, repeatedly positive p-ANCA/MPO and recent finding on bronchoscopy showing ulceration and inflammation of RLL bronchi and stenosis of RLL superior segment, with endobronchial biopsy showing granulomatous inflammation most c/w ANCA vasculitis. Rigid bronchoscopy then performed 7/19/24 showed recurring inflammation, and stenosis of RLL s/p balloon dilation, biopsy performed and no growth to  date on tissue culture. Endemic fungal serologies are negative. CD19 has been at zero consistent with rituximab activity, so we added CD19 stain to endobronchial tissue which resulted negative. She saw ENT locally for supraglottic wall thickening but it seems there was no concern for inflammation/mass. She continues to have a hoarse voice.   - Internal ENT consult for evaluation of supraglottic laryngeal wall thickening as soon as possible  - Follow up BAL cultures  performed in [place] from 8/13   - Continue IV cyclophosphamide per local rheumatology, the dose is being increased to 1200 mg IV q14 days which we agree with  - Continue prednisone 10 mg daily   - No further RTX is currently indicated    - Continue bactrim for PJP prophylaxis   - May stop aerobika as she does not have sputum production   - Lung transplant evaluation to start soon  - Return to see me in 3 months with CBC, CMP, ANCA, CD19 and U/A     2. Chronic cough   Has not responded to ICS, tessalon, nebulizers. Gabapentin considered after inflammation control and reduction in sputum production.   - Continue albuterol nebulizer PRN     3. Hemoptysis-stable  4. Chronic bronchitis secondary to ANCA vasculitis     Return in 3 months with PFTs  Jorge Bailey M.D.    Total time spent today on patient encounter, documentation, review of chart and care coordination was 65 minutes.

## 2024-08-15 ENCOUNTER — ANCILLARY PROCEDURE (OUTPATIENT)
Dept: CT IMAGING | Facility: CLINIC | Age: 64
End: 2024-08-15
Attending: INTERNAL MEDICINE
Payer: COMMERCIAL

## 2024-08-15 ENCOUNTER — LAB (OUTPATIENT)
Dept: LAB | Facility: CLINIC | Age: 64
End: 2024-08-15
Payer: COMMERCIAL

## 2024-08-15 ENCOUNTER — OFFICE VISIT (OUTPATIENT)
Dept: PULMONOLOGY | Facility: CLINIC | Age: 64
End: 2024-08-15
Payer: COMMERCIAL

## 2024-08-15 ENCOUNTER — OFFICE VISIT (OUTPATIENT)
Dept: PULMONOLOGY | Facility: CLINIC | Age: 64
End: 2024-08-15
Attending: INTERNAL MEDICINE
Payer: COMMERCIAL

## 2024-08-15 VITALS
HEIGHT: 62 IN | SYSTOLIC BLOOD PRESSURE: 136 MMHG | BODY MASS INDEX: 23.55 KG/M2 | WEIGHT: 128 LBS | OXYGEN SATURATION: 96 % | HEART RATE: 73 BPM | DIASTOLIC BLOOD PRESSURE: 82 MMHG

## 2024-08-15 DIAGNOSIS — J84.9 ILD (INTERSTITIAL LUNG DISEASE) (H): ICD-10-CM

## 2024-08-15 DIAGNOSIS — I77.82 ANCA-ASSOCIATED VASCULITIS (H): ICD-10-CM

## 2024-08-15 DIAGNOSIS — J96.21 ACUTE ON CHRONIC RESPIRATORY FAILURE WITH HYPOXIA (H): ICD-10-CM

## 2024-08-15 DIAGNOSIS — R49.0 HOARSENESS: ICD-10-CM

## 2024-08-15 DIAGNOSIS — I77.82 ANCA-ASSOCIATED VASCULITIS (H): Primary | ICD-10-CM

## 2024-08-15 LAB
ALBUMIN SERPL BCG-MCNC: 4 G/DL (ref 3.5–5.2)
ALBUMIN UR-MCNC: NEGATIVE MG/DL
ALP SERPL-CCNC: 78 U/L (ref 40–150)
ALT SERPL W P-5'-P-CCNC: <5 U/L (ref 0–50)
ANION GAP SERPL CALCULATED.3IONS-SCNC: 10 MMOL/L (ref 7–15)
APPEARANCE UR: CLEAR
AST SERPL W P-5'-P-CCNC: 13 U/L (ref 0–45)
BASOPHILS # BLD AUTO: ABNORMAL 10*3/UL
BASOPHILS # BLD MANUAL: 0 10E3/UL (ref 0–0.2)
BASOPHILS NFR BLD AUTO: ABNORMAL %
BASOPHILS NFR BLD MANUAL: 0 %
BILIRUB SERPL-MCNC: 0.2 MG/DL
BILIRUB UR QL STRIP: NEGATIVE
BUN SERPL-MCNC: 14.7 MG/DL (ref 8–23)
CALCIUM SERPL-MCNC: 9 MG/DL (ref 8.8–10.4)
CD19 B CELL COMMENT: ABNORMAL
CD19 CELLS # BLD: 1 CELLS/UL (ref 107–698)
CD19 CELLS NFR BLD: <1 % (ref 6–27)
CHLORIDE SERPL-SCNC: 104 MMOL/L (ref 98–107)
COLOR UR AUTO: ABNORMAL
CREAT SERPL-MCNC: 0.63 MG/DL (ref 0.51–0.95)
CRP SERPL-MCNC: 11 MG/L
EGFRCR SERPLBLD CKD-EPI 2021: >90 ML/MIN/1.73M2
EOSINOPHIL # BLD AUTO: ABNORMAL 10*3/UL
EOSINOPHIL # BLD MANUAL: 0.1 10E3/UL (ref 0–0.7)
EOSINOPHIL NFR BLD AUTO: ABNORMAL %
EOSINOPHIL NFR BLD MANUAL: 2 %
ERYTHROCYTE [DISTWIDTH] IN BLOOD BY AUTOMATED COUNT: 15.6 % (ref 10–15)
GLUCOSE SERPL-MCNC: 131 MG/DL (ref 70–99)
GLUCOSE UR STRIP-MCNC: NEGATIVE MG/DL
HCO3 SERPL-SCNC: 27 MMOL/L (ref 22–29)
HCT VFR BLD AUTO: 36.1 % (ref 35–47)
HGB BLD-MCNC: 11.4 G/DL (ref 11.7–15.7)
HGB UR QL STRIP: NEGATIVE
IMM GRANULOCYTES # BLD: ABNORMAL 10*3/UL
IMM GRANULOCYTES NFR BLD: ABNORMAL %
KETONES UR STRIP-MCNC: NEGATIVE MG/DL
LEUKOCYTE ESTERASE UR QL STRIP: NEGATIVE
LYMPHOCYTES # BLD AUTO: ABNORMAL 10*3/UL
LYMPHOCYTES # BLD MANUAL: 0.4 10E3/UL (ref 0.8–5.3)
LYMPHOCYTES NFR BLD AUTO: ABNORMAL %
LYMPHOCYTES NFR BLD MANUAL: 7 %
MCH RBC QN AUTO: 30.2 PG (ref 26.5–33)
MCHC RBC AUTO-ENTMCNC: 31.6 G/DL (ref 31.5–36.5)
MCV RBC AUTO: 96 FL (ref 78–100)
METAMYELOCYTES # BLD MANUAL: 0.1 10E3/UL
METAMYELOCYTES NFR BLD MANUAL: 2 %
MONOCYTES # BLD AUTO: ABNORMAL 10*3/UL
MONOCYTES # BLD MANUAL: 0.3 10E3/UL (ref 0–1.3)
MONOCYTES NFR BLD AUTO: ABNORMAL %
MONOCYTES NFR BLD MANUAL: 6 %
MUCOUS THREADS #/AREA URNS LPF: PRESENT /LPF
MYELOCYTES # BLD MANUAL: 0.3 10E3/UL
MYELOCYTES NFR BLD MANUAL: 6 %
NEUTROPHILS # BLD AUTO: ABNORMAL 10*3/UL
NEUTROPHILS # BLD MANUAL: 4.5 10E3/UL (ref 1.6–8.3)
NEUTROPHILS NFR BLD AUTO: ABNORMAL %
NEUTROPHILS NFR BLD MANUAL: 77 %
NITRATE UR QL: NEGATIVE
NRBC # BLD AUTO: 0 10E3/UL
NRBC # BLD AUTO: 0.1 10E3/UL
NRBC BLD AUTO-RTO: 1 /100
NRBC BLD MANUAL-RTO: 1 %
PH UR STRIP: 7 [PH] (ref 5–7)
PLAT MORPH BLD: ABNORMAL
PLATELET # BLD AUTO: 596 10E3/UL (ref 150–450)
POTASSIUM SERPL-SCNC: 4.1 MMOL/L (ref 3.4–5.3)
PROT SERPL-MCNC: 6.7 G/DL (ref 6.4–8.3)
RBC # BLD AUTO: 3.77 10E6/UL (ref 3.8–5.2)
RBC MORPH BLD: ABNORMAL
RBC URINE: 1 /HPF
SODIUM SERPL-SCNC: 141 MMOL/L (ref 135–145)
SP GR UR STRIP: 1.02 (ref 1–1.03)
SQUAMOUS EPITHELIAL: <1 /HPF
UROBILINOGEN UR STRIP-MCNC: NORMAL MG/DL
WBC # BLD AUTO: 5.8 10E3/UL (ref 4–11)
WBC URINE: 1 /HPF

## 2024-08-15 PROCEDURE — 94729 DIFFUSING CAPACITY: CPT | Performed by: INTERNAL MEDICINE

## 2024-08-15 PROCEDURE — 85027 COMPLETE CBC AUTOMATED: CPT | Performed by: PATHOLOGY

## 2024-08-15 PROCEDURE — 81001 URINALYSIS AUTO W/SCOPE: CPT | Performed by: PATHOLOGY

## 2024-08-15 PROCEDURE — 99213 OFFICE O/P EST LOW 20 MIN: CPT | Performed by: INTERNAL MEDICINE

## 2024-08-15 PROCEDURE — 94375 RESPIRATORY FLOW VOLUME LOOP: CPT | Performed by: INTERNAL MEDICINE

## 2024-08-15 PROCEDURE — 86140 C-REACTIVE PROTEIN: CPT | Performed by: PATHOLOGY

## 2024-08-15 PROCEDURE — 36415 COLL VENOUS BLD VENIPUNCTURE: CPT | Performed by: PATHOLOGY

## 2024-08-15 PROCEDURE — 94726 PLETHYSMOGRAPHY LUNG VOLUMES: CPT | Performed by: INTERNAL MEDICINE

## 2024-08-15 PROCEDURE — 99215 OFFICE O/P EST HI 40 MIN: CPT | Mod: 25 | Performed by: INTERNAL MEDICINE

## 2024-08-15 PROCEDURE — 71250 CT THORAX DX C-: CPT | Mod: GC | Performed by: RADIOLOGY

## 2024-08-15 PROCEDURE — 85007 BL SMEAR W/DIFF WBC COUNT: CPT | Performed by: PATHOLOGY

## 2024-08-15 PROCEDURE — 86355 B CELLS TOTAL COUNT: CPT | Performed by: INTERNAL MEDICINE

## 2024-08-15 PROCEDURE — 86036 ANCA SCREEN EACH ANTIBODY: CPT | Performed by: INTERNAL MEDICINE

## 2024-08-15 PROCEDURE — 80053 COMPREHEN METABOLIC PANEL: CPT | Performed by: PATHOLOGY

## 2024-08-15 PROCEDURE — 99000 SPECIMEN HANDLING OFFICE-LAB: CPT | Performed by: PATHOLOGY

## 2024-08-15 NOTE — LETTER
8/15/2024      Rose Krishna  [address] [place] [address]      Dear Colleague,    Thank you for referring your patient, Rose Krishna, to the [facility] AND [facility] [place]. Please see a copy of my visit note below.    [facility]    Reason for Visit  Rose Krishna is a 64 year old year old female who is being seen for Interstitial Lung Disease (ILD) (ILD follow up )  HPI  Patient is 64 years old female with no previous pulm history prior to the year 2021, remote diagnosis of ulcerative colitis 1985 post sulfa and prednisone at the time, never needing biologics and mainly self-limited over the last several years. She first developed symptoms of chronic cough in December 2021.  Her cough was dry at the time, but gradually became productive, coughing up up to half-1 cough of greenish frothy sputum every day.  In early 2022 she received Tessalon, followed albuterol inhaler [worsened cough], amoxicillin, then azithromycin (for pneumonia on CT), prednisone bursts starting with 20 mg max dose, and eventually Breo in July 2022 with no improvement in the cough at any point.  She has never been on an ACE inhibitor.  Chest imaging suggested pneumonia in May 2022 by CT for which azithromycin was given.  Her labs showed no eosinophilia but her MPO antibody was positive.  She then underwent cardiac, ENT and GI workup for other causes of cough and all returned within normal.  Methacholine challenge test was performed in July 2023 (FEV1 dropped by 22% and was interpreted as positive) which suggested a new diagnosis of asthma.  She was then prescribed multiple inhalers with no improvement including budesonide and Breztri, this is specifically perplexing as she also does not respond to prednisone 20 mg.  Nebulizers also do not help. She saw [place] (Dr. Velazquez) and at the time her bronchitis was suspected to be an association  "of her IBD, and this was prior to her lung consolidation finding. Myeloperoxidase antibody and P-ANCA were positive on repeat testing.  CT chest imaging in January 2024 showed a right lower lobe consolidation and she underwent bronchoscopy with BAL, her BAL did not show infection and there was no eosinophils.  Right lower lobe demonstrated an ulcer which was biopsied, and we have the report from outside documenting granulomatous inflammation with negative AFB and fungal stains.  The airway exam was also significant for diffuse inflammation. She was started on prednisone at my direction 60 mg p.o. daily along with Bactrim on 3/16/24 when I received a phone call to discuss her case. She is a lifetime non-smoker and never had drug use.  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    Interval Hx 6/24:  Started on Rituximab on 4/12 along with 3 days of solumedrol infusion for increasing hemoptysis. Had tried to titrate down on prednisone multiple times unsuccessfully. Had some stridor per PCP, worsening cough/congestion on 5/24 so CT neck ordered. Saw rheumatology on 5/29 and returned to 60 mg Prednisone, started on Tavneous and a 10 day course of Augmentin.  Planned for repeat bronch prior to starting cytoxin.     Overall, patient feels like she \"got run over by a truck\". Did not note a difference in her symptoms with increase of prednisone or starting the Tavneos this week so far. She has been having a \"wetter\", more productive cough, constant, worse in am. Still having peach/pink sputum. Much more shortness of breath with shorter distances, stairs are difficult. Feels wheezy, audible stridor worse when lying flat in bed. Sputum has been green but Augmentin helped this. No fevers, chills, night sweats. Not on any inhalers or nebs since they do not help her. No leg swelling, chest pain, orthopnea. BP today 137/91, worsening BP which is new for her and she is concerned about " "this.     Interval history 8/15/24:  Rose returns in light of exacerbation of symptoms. She underwent rigid bronchoscopy, balloon dilation, endobronchial biopsy and steroid injection of RLL 7/19/24 complicated by acute hypoxic respiratory failure requiring 2-4 LPM at rest and several days of admission. She started feeling better after discharge, but then her cough recurred, and underwent a follow up CT chest locally with Dr Viera around 2 weeks ago, which showed new multisegmental consolidation of RLL that was concerning for necrotizing pneumonia vs ANCA vasculitis flare. She underwent repeat bronchoscopy with BAL by Dr Viera and I was able to review a video of this inspection, showing now involvement in the left lobes, and airway erythema and inflammation that is significant in the RLL, with patent segments. She denies any fever over the last 4 weeks. She is now on cyclophosphamide IV since June 2024 as a transition from RTX given suspected suboptimal response to RTX. She is also back on avacopan after 2 weeks interruption in late July 2024. She has intermittent hemoptysis still. Her voice is still hoarse but not significantly changed.     Vitals: /82   Pulse 73   Ht 1.575 m (5' 2\")   Wt 58.1 kg (128 lb)   SpO2 96%   BMI 23.41 kg/m      Exam:   GENERAL APPEARANCE: Well developed, well nourished, alert, and in no apparent distress.  RESP: good air flow throughout. Pan-inspiratory wheeze bilaterally, mainly in the RLL.  CV: Normal S1, S2, regular rhythm, normal rate. No murmur.  No LE edema.   MS: extremities normal. No clubbing. No cyanosis.  SKIN: no rash on limited exam.  NEURO: Mentation intact, speech normal, normal gait and stance.  PSYCH: mentation appears normal. and affect normal/bright.  Results:      PFTs 8/15/24:    My interpretation: moderate degree obstruction, preserved diffusion capacity corrected for Hb. Obstruction has progressed since 3/2024.         Chest imaging:    CT Chest " 6/6/24:  IMPRESSION: Patchy groundglass opacities, bronchial wall thickening  and cavitary nodule right upper lobe compatible with ANCA vasculitis,  with groundglass opacities likely representing mild alveolar  hemorrhage likely accounting for hemoptysis.  Reviewed CT chest images from today 8/15/24 and compared to 8/2/24, agreed with radiology report:  Narrative & Impression   EXAM: CT CHEST HI-RESOLUTION WO CONTRAST 8/15/2024 2:26 PM     HISTORY: 64 years Female ANCA-associated vasculitis (H); ILD  (interstitial lung disease) (H)     COMPARISON: CT chest 6/6/2024.     TECHNIQUE: Helical CT imaging of the chest was obtained without  contrast. Multiplanar post-processed and MIP reformats were obtained  reviewed. Inspiratory and expiratory images were obtained.     FINDINGS:        LINES/TUBES: None.        MEDIASTINUM: No suspicious lymphadenopathy. Borderline-enlarged  thoracic ascending aorta measuring 4.0 cm. Normal size of the  pulmonary trunk.. Normal heart size. No pericardial effusion.      LUNG: Mucous plugging within the right lower lobe segmental bronchus  (previously narrowed due to bronchial wall thickening  6/6/2024 with resultant confluent right lower lobe opacities, as well  as superimposed micronodular opacities throughout the right lower  lobe. Additional areas of mucous plugging in the right middle lobe and  left lower lobe. Groundglass opacities in the right lung apex,  increased compared to prior.. Expiratory images demonstrate mild air  trapping.. Decreased size of the subpleural right upper lobe nodule  measuring 4 mm (3/76), previously measuring 7 mm and demonstrating  central cavitation, which is not present on today's examination.  Additional 4 mm subpleural right upper lobe nodule (3/77), previously  4 mm..     PLEURA: No pneumothorax or pleural effusion.        LOWER NECK: Thyroid unremarkable.     UPPER ABDOMEN: Limited evaluation due to lack of contrast. Esophagus  appears unremarkable.  Cholecystectomy.     BONES: No acute osseous abnormality. No suspicious bony lesions.     SOFT TISSUES: Postsurgical changes of bilateral breast augmentation..                                                                         IMPRESSION:   1.  Scattered mucus plugging, most pronounced within the right lower  lobe with resultant right lower lobe consolidative opacities, likely  infectious, particularly given adjacent tree in bud nodularity, though  a degree of hemorrhagic consolidation related to vasculitis is a  consideration was well.  2.  Increased patchy groundglass opacities in the right upper lobe,  which may represent mild alveolar hemorrhage in the setting of  hemoptysis/ANCA vasculitis.  3.  Decreased size of the previously visualized cavitary right upper  lobe pulmonary nodule with resolution of the cavitary component. No  new cavitary nodules identified.     I have personally reviewed the examination and initial interpretation  and I agree with the findings.          Assessment and plan:   MPO ANCA-associated vasculitis, single organ, endobronchial inflammation seems to be refractory to rituximab, RLL stenosis s/p balloon dilation and steroid injection and severe chronic bronchitis, BVAS persistent score=3, new/worse points score=4  Rose is 64F with chronic productive cough starting in late 2021, hemoptysis most of 2023, repeatedly positive p-ANCA/MPO and recent finding on bronchoscopy showing ulceration and inflammation of RLL bronchi and stenosis of RLL superior segment, with endobronchial biopsy showing granulomatous inflammation most c/w ANCA vasculitis. Rigid bronchoscopy then performed 7/19/24 showed recurring inflammation, and stenosis of RLL s/p balloon dilation, biopsy performed and no growth to  date on tissue culture. Endemic fungal serologies are negative. CD19 has been at zero consistent with rituximab activity, so we added CD19 stain to endobronchial tissue which resulted negative. She  saw ENT locally for supraglottic wall thickening but it seems there was no concern for inflammation/mass. She continues to have a hoarse voice.   - Internal ENT consult for evaluation of supraglottic laryngeal wall thickening as soon as possible  - Follow up BAL cultures performed in [place] from 8/13   - Continue IV cyclophosphamide per local rheumatology, the dose is being increased to 1200 mg IV q14 days which we agree with  - Continue prednisone 10 mg daily   - No further RTX is currently indicated    - Continue bactrim for PJP prophylaxis   - May stop aerobika as she does not have sputum production   - Lung transplant evaluation to start soon  - Return to see me in 3 months with CBC, CMP, ANCA, CD19 and U/A     2. Chronic cough   Has not responded to ICS, tessalon, nebulizers. Gabapentin considered after inflammation control and reduction in sputum production.   - Continue albuterol nebulizer PRN     3. Hemoptysis-stable  4. Chronic bronchitis secondary to ANCA vasculitis     Return in 3 months with PFTs  Jorge Bailey M.D.    Total time spent today on patient encounter, documentation, review of chart and care coordination was 65 minutes.       Again, thank you for allowing me to participate in the care of your patient.        Sincerely,        Jorge Bailey MD

## 2024-08-15 NOTE — NURSING NOTE
Chief Complaint   Patient presents with    Interstitial Lung Disease (ILD)     ILD follow up      Vitals were taken and medications were reconciled.    Rosalia Casarez RMA  2:53 PM

## 2024-08-15 NOTE — PATIENT INSTRUCTIONS
May stop aerobika   ENT will call you for appointment  Continue IV cyclophosphamide   Continue prednisone 10 mg   Continue bactrim   No more rituximab    We will call you with CD19 result on tissue for next step   We will follow on cultures to decide on antibiotics     Please call our direct ILD nurses line for questions and concerns: [phone]    For scheduling, please call: [phone]

## 2024-08-16 ENCOUNTER — TELEPHONE (OUTPATIENT)
Dept: PULMONOLOGY | Facility: CLINIC | Age: 64
End: 2024-08-16
Payer: COMMERCIAL

## 2024-08-16 ENCOUNTER — MYC MEDICAL ADVICE (OUTPATIENT)
Dept: PULMONOLOGY | Facility: CLINIC | Age: 64
End: 2024-08-16
Payer: COMMERCIAL

## 2024-08-16 DIAGNOSIS — R49.0 HOARSENESS: ICD-10-CM

## 2024-08-16 DIAGNOSIS — I77.82 ANCA-ASSOCIATED VASCULITIS (H): Primary | ICD-10-CM

## 2024-08-16 LAB
ANCA AB PATTERN SER IF-IMP: NORMAL
BACTERIA TISS BX CULT: NO GROWTH
C-ANCA TITR SER IF: NORMAL {TITER}
DLCOUNC-%PRED-PRE: 77 %
DLCOUNC-PRE: 14.34 ML/MIN/MMHG
DLCOUNC-PRED: 18.42 ML/MIN/MMHG
ERV-%PRED-PRE: 37 %
ERV-PRE: 0.36 L
ERV-PRED: 0.97 L
EXPTIME-PRE: 7.94 SEC
FEF2575-%PRED-PRE: 24 %
FEF2575-PRE: 0.47 L/SEC
FEF2575-PRED: 1.89 L/SEC
FEFMAX-%PRED-PRE: 36 %
FEFMAX-PRE: 2.08 L/SEC
FEFMAX-PRED: 5.72 L/SEC
FEV1-%PRED-PRE: 44 %
FEV1-PRE: 0.92 L
FEV1FEV6-PRE: 64 %
FEV1FEV6-PRED: 80 %
FEV1FVC-PRE: 64 %
FEV1FVC-PRED: 80 %
FEV1SVC-PRE: 57 %
FEV1SVC-PRED: 70 %
FIFMAX-PRE: 1.85 L/SEC
FRCPLETH-%PRED-PRE: 100 %
FRCPLETH-PRE: 2.59 L
FRCPLETH-PRED: 2.59 L
FVC-%PRED-PRE: 55 %
FVC-PRE: 1.44 L
FVC-PRED: 2.59 L
IC-%PRED-PRE: 65 %
IC-PRE: 1.26 L
IC-PRED: 1.94 L
RVPLETH-%PRED-PRE: 118 %
RVPLETH-PRE: 2.23 L
RVPLETH-PRED: 1.88 L
TLCPLETH-%PRED-PRE: 83 %
TLCPLETH-PRE: 3.86 L
TLCPLETH-PRED: 4.6 L
VA-%PRED-PRE: 54 %
VA-PRE: 2.37 L
VC-%PRED-PRE: 54 %
VC-PRE: 1.63 L
VC-PRED: 2.96 L

## 2024-08-16 NOTE — TELEPHONE ENCOUNTER
Patient confirmed scheduled appointment:  Date: 12/5/24  Time: 1:30PM  Visit type: RIL  Provider: CAMILLE  Location: [facility]  Testing/imaging: N/A  Additional notes: N/A

## 2024-09-04 ENCOUNTER — MYC MEDICAL ADVICE (OUTPATIENT)
Dept: PULMONOLOGY | Facility: CLINIC | Age: 64
End: 2024-09-04
Payer: COMMERCIAL

## 2024-09-04 NOTE — TELEPHONE ENCOUNTER
SOT LUNG INTAKE    September 4, 2024    Rose Krishna  [id number]  Referring Provider: Jorge Bailey  Source/Facility: [facility]   Rheumatologist: Bryn)   Established with PCP? Yes   Referral packet and welcome letter received? Yes  Preferred communication medium?   64 year old      Social History:    Social History     Socioeconomic History    Marital status:      Spouse name: Not on file    Number of children: Not on file    Years of education: Not on file    Highest education level: Not on file   Occupational History    Not on file   Tobacco Use    Smoking status: Never    Smokeless tobacco: Never   Substance and Sexual Activity    Alcohol use: Not Currently    Drug use: Never    Sexual activity: Not on file   Other Topics Concern    Parent/sibling w/ CABG, MI or angioplasty before 65F 55M? No   Social History Narrative    Not on file     Social Determinants of Health     Financial Resource Strain: Low Risk  (3/26/2024)    Received from [facility]    Overall Financial Resource Strain (CARDIA)     Difficulty of Paying Living Expenses: Not very hard   Food Insecurity: No Food Insecurity (3/26/2024)    Received from [facility]    Hunger Vital Sign     Worried About Running Out of Food in the Last Year: Never true     Ran Out of Food in the Last Year: Never true   Transportation Needs: No Transportation Needs (3/26/2024)    Received from [facility]    PRAPARE - Transportation     Lack of Transportation (Medical): No     Lack of Transportation (Non-Medical): No   Physical Activity: Insufficiently Active (3/26/2024)    Received from [facility]    Exercise Vital Sign     Days of Exercise per Week: 4 days     Minutes of Exercise per Session: 20 min   Stress: No Stress Concern Present (3/26/2024)    Received from [facility]    [employer] - Occupational Stress Questionnaire      Feeling of Stress : Only a little   Social Connections: Socially Integrated (3/26/2024)    Received from [facility]    Social Connection and Isolation Panel [NHANES]     Frequency of Communication with Friends and Family: More than three times a week     Frequency of Social Gatherings with Friends and Family: More than three times a week     Attends Buddhism Services: More than 4 times per year     Active Member of Clubs or Organizations: Yes     Attends Club or Organization Meetings: 1 to 4 times per year     Marital Status:    Interpersonal Safety: Not At Risk (2/7/2023)    [facility]    Humiliation, Afraid, Rape, and Kick questionnaire     Fear of Current or Ex-Partner: No     Emotionally Abused: No     Physically Abused: No     Sexually Abused: No   Housing Stability: Low Risk  (3/26/2024)    Received from [facility]    Housing Stability Vital Sign     Unable to Pay for Housing in the Last Year: No     Number of Places Lived in the Last Year: 1     Unstable Housing in the Last Year: No       Smoking History: Never    Tobacco Use: Never    Street Drug Use: Never    ETOH Use: None currently just social     - Discussed transplant requirement of pre and post transplant abstinence with monitoring of peth testing with patient.      Family History:  No family history on file.  Mother- myoplastic syndrome, passed away at 79, likely from MI  Father- Prostate CA, currently 92, pretty healthy     Past Medical History  Past Medical History:   Diagnosis Date    PONV (postoperative nausea and vomiting)        Surgical History   Lung Biopsy: 7/7/2024      Past Surgical History:   Procedure Laterality Date    BIOPSY      COLONOSCOPY  11/2023    INJECT STEROID (LOCATION) N/A 07/19/2024    Procedure: steroid injection,bronchial biopsy.;  Surgeon: Kemal Dixon MD;  Location: [facility] OR    LASER CO2 BRONCHOSCOPY N/A  07/19/2024    Procedure: BRONCHOSCOPY, RIGID, tissue debulking balloon dilation,;  Surgeon: Kemal Dixon MD;  Location:  OR       Medications  Current Outpatient Medications   Medication Sig Dispense Refill    albuterol (PROAIR HFA/PROVENTIL HFA/VENTOLIN HFA) 108 (90 Base) MCG/ACT inhaler Inhale 2 puffs into the lungs every 4 hours as needed for shortness of breath, wheezing or cough 18 g 0    estradiol (VIVELLE-DOT) 0.0375 MG/24HR BIW patch Place 1 patch onto the skin twice a week on Mondays and Thursdays      fluticasone (FLONASE) 50 MCG/ACT nasal spray Spray 1-2 sprays into both nostrils daily as needed for rhinitis 9.9 mL 0    levothyroxine (SYNTHROID/LEVOTHROID) 112 MCG tablet Take 112 mcg by mouth daily      mesna (MESNEX) 400 MG TABS tablet Take 400 mg by mouth twice daily on infusion days      omeprazole (PRILOSEC) 20 MG DR capsule Take 20 mg by mouth daily      predniSONE (DELTASONE) 20 MG tablet Take 2 tablets (40 mg) by mouth daily 2 tablet 0    valACYclovir (VALTREX) 500 MG tablet Take 500 mg by mouth 2 times daily       No current facility-administered medications for this visit.     Oxygen use:    At rest: None    Sleep: 2L NOC   With Activity: None     Sleep Study: Yes    BiPAP/CPAP: No    Pulmonary Rehab: Limited Access locally has not officially attended  Current activity: Daily walk     Current activity:  Basic ADLs    Feeding/Swallowing:   CT Neck studies, referred to ENT for supra-lyengel thickening, Lyerocsopies at [facility]     Primary Care  Established with a PCP?   Mammogram: August 2024  PAP: Hysterectomy. Menoragia   Colonoscopy: Nov 2023  Dental: Regular

## 2024-09-10 ENCOUNTER — VIRTUAL VISIT (OUTPATIENT)
Dept: PULMONOLOGY | Facility: CLINIC | Age: 64
End: 2024-09-10
Attending: STUDENT IN AN ORGANIZED HEALTH CARE EDUCATION/TRAINING PROGRAM
Payer: COMMERCIAL

## 2024-09-10 ENCOUNTER — TELEPHONE (OUTPATIENT)
Dept: OTOLARYNGOLOGY | Facility: CLINIC | Age: 64
End: 2024-09-10

## 2024-09-10 VITALS — HEIGHT: 62 IN | BODY MASS INDEX: 23.55 KG/M2 | WEIGHT: 128 LBS

## 2024-09-10 DIAGNOSIS — R91.1 LUNG NODULE: Primary | ICD-10-CM

## 2024-09-10 PROCEDURE — 99214 OFFICE O/P EST MOD 30 MIN: CPT | Mod: 95 | Performed by: STUDENT IN AN ORGANIZED HEALTH CARE EDUCATION/TRAINING PROGRAM

## 2024-09-10 ASSESSMENT — PAIN SCALES - GENERAL: PAINLEVEL: NO PAIN (0)

## 2024-09-10 NOTE — LETTER
9/10/2024       RE: Rose Krishna  [address] [place] [address]     Dear Colleague,    Thank you for referring your patient, Rose Krishna, to the [facility] at [facility]. Please see a copy of my visit note below.    Virtual Visit Details    Type of service:  Video Visit     Originating Location (pt. 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     She had a bronchoscopy done my by colleague on 7/19/24. During her procedure, she had diffuse erythematous airways. Intervention was limited due to this. No steroid injection given. Endobronchial biopsy consistent with capillaritis.     I independently reviewed their CT scan from 8/15/24 which reveals a RLL consolidation. Given her sob and CT findings, this is consistent with a pneumonia.     Plan to treat with 5 days of Augmentin.     Follow up CT in 1 month and clinic visit after this.     She did not have a great experience with her prior bronchoscopy. I explained to her this was likely due to her inflamed airways and limited visibility.     She also has a hx of supraglottic stenosis and would like to see ENT. She is also being referred for lung transplant.     Patient indicated understanding and agreed to the plan of care. All questions answered.     Deepak Cole DO   of Medicine  Interventional Pulmonology  Department  "of Pulmonary, Allergy, Critical Care and Sleep Medicine   [facility]     History:  Rose Krishna is a 64 year old female who presents for evaluation of bronchial stenosis.   Shortness of breath is worse than before the procedure in July.   No fever  Otherwise stable.     Medications:  Current Outpatient Medications   Medication Sig Dispense Refill     albuterol (PROAIR HFA/PROVENTIL HFA/VENTOLIN HFA) 108 (90 Base) MCG/ACT inhaler Inhale 2 puffs into the lungs every 4 hours as needed for shortness of breath, wheezing or cough 18 g 0     estradiol (VIVELLE-DOT) 0.0375 MG/24HR BIW patch Place 1 patch onto the skin twice a week on Mondays and Thursdays       fluticasone (FLONASE) 50 MCG/ACT nasal spray Spray 1-2 sprays into both nostrils daily as needed for rhinitis 9.9 mL 0     levothyroxine (SYNTHROID/LEVOTHROID) 112 MCG tablet Take 112 mcg by mouth daily       mesna (MESNEX) 400 MG TABS tablet Take 400 mg by mouth twice daily on infusion days       omeprazole (PRILOSEC) 20 MG DR capsule Take 20 mg by mouth daily       predniSONE (DELTASONE) 20 MG tablet Take 2 tablets (40 mg) by mouth daily 2 tablet 0     valACYclovir (VALTREX) 500 MG tablet Take 500 mg by mouth 2 times daily       No current facility-administered medications for this visit.         Physical exam:  Ht 1.575 m (5' 2\")   Wt 58.1 kg (128 lb)   BMI 23.41 kg/m    Wt Readings from Last 4 Encounters:   09/10/24 58.1 kg (128 lb)   08/15/24 58.1 kg (128 lb)   08/12/24 58.1 kg (128 lb)   07/22/24 58.7 kg (129 lb 6.4 oz)     General: Well appearing, nonlabored breathing  Neuro: Answering questions appropriately  Psych: Normal affect     Again, thank you for allowing me to participate in the care of your patient.      Sincerely,    Deepak Cole, DO    "

## 2024-09-10 NOTE — NURSING NOTE
Current patient location: [address] [place] [address] [address] [place] [place] [address]    Is the patient currently in the state of [place]? YES    Visit mode:VIDEO    If the visit is dropped, the patient can be reconnected by: VIDEO VISIT: Text to cell phone:   Telephone Information:   Mobile [phone]       Will anyone else be joining the visit? NO  (If patient encounters technical issues they should call [phone]    How would you like to obtain your AVS? MyChart    Are changes needed to the allergy or medication list? No    Are refills needed on medications prescribed by this physician? NO    Rooming Documentation:  Questionnaire(s) not done per department protocol      Reason for visit: LC CHAPMAN

## 2024-09-10 NOTE — PROGRESS NOTES
Virtual Visit Details    Type of service:  Video Visit     Originating Location (pt. 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     She had a bronchoscopy done my by colleague on 7/19/24. During her procedure, she had diffuse erythematous airways. Intervention was limited due to this. No steroid injection given. Endobronchial biopsy consistent with capillaritis.     I independently reviewed their CT scan from 8/15/24 which reveals a RLL consolidation. Given her sob and CT findings, this is consistent with a pneumonia.     Plan to treat with 5 days of Augmentin.     Follow up CT in 1 month and clinic visit after this.     She did not have a great experience with her prior bronchoscopy. I explained to her this was likely due to her inflamed airways and limited visibility.     She also has a hx of supraglottic stenosis and would like to see ENT. She is also being referred for lung transplant.     Patient indicated understanding and agreed to the plan of care. All questions answered.     Deepak Cole DO   of Medicine  Interventional Pulmonology  Department of Pulmonary, Allergy, Critical Care and Sleep Medicine   Critical access hospital     History:  Rose Krishna is a 64 year old female who presents for evaluation of bronchial stenosis.   Shortness of breath is worse than before the procedure in July.   No fever  Otherwise stable.  "    Medications:  Current Outpatient Medications   Medication Sig Dispense Refill    albuterol (PROAIR HFA/PROVENTIL HFA/VENTOLIN HFA) 108 (90 Base) MCG/ACT inhaler Inhale 2 puffs into the lungs every 4 hours as needed for shortness of breath, wheezing or cough 18 g 0    estradiol (VIVELLE-DOT) 0.0375 MG/24HR BIW patch Place 1 patch onto the skin twice a week on Mondays and Thursdays      fluticasone (FLONASE) 50 MCG/ACT nasal spray Spray 1-2 sprays into both nostrils daily as needed for rhinitis 9.9 mL 0    levothyroxine (SYNTHROID/LEVOTHROID) 112 MCG tablet Take 112 mcg by mouth daily      mesna (MESNEX) 400 MG TABS tablet Take 400 mg by mouth twice daily on infusion days      omeprazole (PRILOSEC) 20 MG DR capsule Take 20 mg by mouth daily      predniSONE (DELTASONE) 20 MG tablet Take 2 tablets (40 mg) by mouth daily 2 tablet 0    valACYclovir (VALTREX) 500 MG tablet Take 500 mg by mouth 2 times daily       No current facility-administered medications for this visit.         Physical exam:  Ht 1.575 m (5' 2\")   Wt 58.1 kg (128 lb)   BMI 23.41 kg/m    Wt Readings from Last 4 Encounters:   09/10/24 58.1 kg (128 lb)   08/15/24 58.1 kg (128 lb)   08/12/24 58.1 kg (128 lb)   07/22/24 58.7 kg (129 lb 6.4 oz)     General: Well appearing, nonlabored breathing  Neuro: Answering questions appropriately  Psych: Normal affect       "

## 2024-09-11 ENCOUNTER — TELEPHONE (OUTPATIENT)
Dept: OTOLARYNGOLOGY | Facility: CLINIC | Age: 64
End: 2024-09-11
Payer: COMMERCIAL

## 2024-09-13 ENCOUNTER — TELEPHONE (OUTPATIENT)
Dept: PULMONOLOGY | Facility: CLINIC | Age: 64
End: 2024-09-13
Payer: COMMERCIAL

## 2024-09-13 LAB
ACID FAST STAIN (ARUP): NORMAL

## 2024-10-11 DIAGNOSIS — J84.9 ILD (INTERSTITIAL LUNG DISEASE) (H): ICD-10-CM

## 2024-10-11 DIAGNOSIS — Z01.818 ENCOUNTER FOR PRE-TRANSPLANT EVALUATION FOR LUNG TRANSPLANT: Primary | ICD-10-CM

## 2024-10-11 DIAGNOSIS — I77.82 ANCA-ASSOCIATED VASCULITIS (H): ICD-10-CM

## 2024-10-13 ENCOUNTER — MYC MEDICAL ADVICE (OUTPATIENT)
Dept: PULMONOLOGY | Facility: CLINIC | Age: 64
End: 2024-10-13
Payer: COMMERCIAL

## 2024-10-14 ENCOUNTER — TELEPHONE (OUTPATIENT)
Dept: TRANSPLANT | Facility: CLINIC | Age: 64
End: 2024-10-14
Payer: COMMERCIAL

## 2024-10-14 NOTE — TELEPHONE ENCOUNTER
Was recently seen at [facility] by vasculitis speciality team (Dr Doug Poole) and completed a bronch with Duane Squires at [facility].     Patient noted there has been a change in plan of care and she would like to hold off on being seen by Transplant Pulmonologist at this time.     Will cancel appt with Dr Wu on 10/23 at 330PM

## 2024-10-14 NOTE — TELEPHONE ENCOUNTER
Patient Call: General  Route to LPN    Reason for call: Patient returning call back from last Friday about pulmonology    Call back needed? Yes    Return Call Needed  Same as documented in contacts section  When to return call?: Same day: Route High Priority

## 2024-11-06 ENCOUNTER — MYC MEDICAL ADVICE (OUTPATIENT)
Dept: PULMONOLOGY | Facility: CLINIC | Age: 64
End: 2024-11-06
Payer: COMMERCIAL

## 2025-03-16 ENCOUNTER — HEALTH MAINTENANCE LETTER (OUTPATIENT)
Age: 65
End: 2025-03-16

## (undated) DEVICE — SYR 10ML SLIP TIP W/O NDL 303134

## (undated) DEVICE — PITCHER STERILE 1000ML  SSK9004A

## (undated) DEVICE — ENDO ADPT BRONCH SWIVEL Y A1002

## (undated) DEVICE — TUBING SUCTION 10'X3/16" N510

## (undated) DEVICE — CATH BALLOON ELATION PULMONARY 3CM 8-9-10MMX100CM P8L30

## (undated) DEVICE — ANTIFOG SOLUTION W/FOAM PAD 31142527

## (undated) DEVICE — ENDO VALVE BX EVIS MAJ-210

## (undated) DEVICE — LABEL MEDICATION SYSTEM 3303-P

## (undated) DEVICE — ENDO FORCEP ALLIGATOR JAW BIOPSY 2MMX100CM FB-211D

## (undated) DEVICE — DRSG TELFA 3X8" 1238

## (undated) DEVICE — LINEN TOWEL PACK X5 5464

## (undated) DEVICE — ENDO VALVE SUCTION BRONCH EVIS MAJ-209

## (undated) DEVICE — KIT ENDO FIRST STEP DISINFECTANT 200ML W/POUCH EP-4

## (undated) DEVICE — NDL SCLEROTHERAPY 1.8MM 26GA 200CM M00518160

## (undated) DEVICE — SYR 30ML SLIP TIP W/O NDL 302833

## (undated) RX ORDER — TRIAMCINOLONE ACETONIDE 40 MG/ML
INJECTION, SUSPENSION INTRA-ARTICULAR; INTRAMUSCULAR
Status: DISPENSED
Start: 2024-07-19

## (undated) RX ORDER — IOPAMIDOL 510 MG/ML
INJECTION, SOLUTION INTRAVASCULAR
Status: DISPENSED
Start: 2024-07-19

## (undated) RX ORDER — FENTANYL CITRATE-0.9 % NACL/PF 10 MCG/ML
PLASTIC BAG, INJECTION (ML) INTRAVENOUS
Status: DISPENSED
Start: 2024-07-19

## (undated) RX ORDER — CEFAZOLIN SODIUM/WATER 2 G/20 ML
SYRINGE (ML) INTRAVENOUS
Status: DISPENSED
Start: 2024-07-19

## (undated) RX ORDER — FENTANYL CITRATE 50 UG/ML
INJECTION, SOLUTION INTRAMUSCULAR; INTRAVENOUS
Status: DISPENSED
Start: 2024-07-19

## (undated) RX ORDER — GLYCOPYRROLATE 0.2 MG/ML
INJECTION, SOLUTION INTRAMUSCULAR; INTRAVENOUS
Status: DISPENSED
Start: 2024-07-19

## (undated) RX ORDER — LABETALOL HYDROCHLORIDE 5 MG/ML
INJECTION, SOLUTION INTRAVENOUS
Status: DISPENSED
Start: 2024-07-19

## (undated) RX ORDER — IOPAMIDOL 408 MG/ML
INJECTION, SOLUTION INTRATHECAL
Status: DISPENSED
Start: 2024-07-19

## (undated) RX ORDER — CALCIUM CHLORIDE 100 MG/ML
INJECTION INTRAVENOUS; INTRAVENTRICULAR
Status: DISPENSED
Start: 2024-07-19

## (undated) RX ORDER — DEXAMETHASONE SODIUM PHOSPHATE 4 MG/ML
INJECTION, SOLUTION INTRA-ARTICULAR; INTRALESIONAL; INTRAMUSCULAR; INTRAVENOUS; SOFT TISSUE
Status: DISPENSED
Start: 2024-07-19

## (undated) RX ORDER — ONDANSETRON 2 MG/ML
INJECTION INTRAMUSCULAR; INTRAVENOUS
Status: DISPENSED
Start: 2024-07-19

## (undated) RX ORDER — IOPAMIDOL 755 MG/ML
INJECTION, SOLUTION INTRAVASCULAR
Status: DISPENSED
Start: 2024-07-19

## (undated) RX ORDER — ESMOLOL HYDROCHLORIDE 10 MG/ML
INJECTION INTRAVENOUS
Status: DISPENSED
Start: 2024-07-19

## (undated) RX ORDER — ALBUTEROL SULFATE 0.83 MG/ML
SOLUTION RESPIRATORY (INHALATION)
Status: DISPENSED
Start: 2024-07-19

## (undated) RX ORDER — PROPOFOL 10 MG/ML
INJECTION, EMULSION INTRAVENOUS
Status: DISPENSED
Start: 2024-07-19

## (undated) RX ORDER — ACETAMINOPHEN 325 MG/1
TABLET ORAL
Status: DISPENSED
Start: 2024-07-19

## (undated) RX ORDER — LIDOCAINE HYDROCHLORIDE 40 MG/ML
INJECTION, SOLUTION RETROBULBAR
Status: DISPENSED
Start: 2024-07-19

## (undated) RX ORDER — LIDOCAINE HYDROCHLORIDE 10 MG/ML
INJECTION, SOLUTION EPIDURAL; INFILTRATION; INTRACAUDAL; PERINEURAL
Status: DISPENSED
Start: 2024-07-19